# Patient Record
Sex: FEMALE | Race: WHITE | NOT HISPANIC OR LATINO | URBAN - METROPOLITAN AREA
[De-identification: names, ages, dates, MRNs, and addresses within clinical notes are randomized per-mention and may not be internally consistent; named-entity substitution may affect disease eponyms.]

---

## 2017-01-12 ENCOUNTER — EMERGENCY (EMERGENCY)
Facility: HOSPITAL | Age: 52
LOS: 1 days | Discharge: HOME | End: 2017-01-12
Attending: EMERGENCY MEDICINE
Payer: COMMERCIAL

## 2017-01-12 VITALS
SYSTOLIC BLOOD PRESSURE: 124 MMHG | WEIGHT: 149.91 LBS | HEART RATE: 134 BPM | TEMPERATURE: 99 F | RESPIRATION RATE: 20 BRPM | DIASTOLIC BLOOD PRESSURE: 84 MMHG | HEIGHT: 62 IN | OXYGEN SATURATION: 95 %

## 2017-01-12 DIAGNOSIS — Z90.710 ACQUIRED ABSENCE OF BOTH CERVIX AND UTERUS: Chronic | ICD-10-CM

## 2017-01-12 DIAGNOSIS — Z41.9 ENCOUNTER FOR PROCEDURE FOR PURPOSES OTHER THAN REMEDYING HEALTH STATE, UNSPECIFIED: Chronic | ICD-10-CM

## 2017-01-12 LAB
ALBUMIN SERPL ELPH-MCNC: 4.7 G/DL — SIGNIFICANT CHANGE UP (ref 3.3–5.2)
ALP SERPL-CCNC: 103 U/L — SIGNIFICANT CHANGE UP (ref 40–120)
ALT FLD-CCNC: 22 U/L — SIGNIFICANT CHANGE UP
ANION GAP SERPL CALC-SCNC: 16 MMOL/L — SIGNIFICANT CHANGE UP (ref 5–17)
AST SERPL-CCNC: 20 U/L — SIGNIFICANT CHANGE UP
BASOPHILS # BLD AUTO: 0 K/UL — SIGNIFICANT CHANGE UP (ref 0–0.2)
BASOPHILS NFR BLD AUTO: 0.1 % — SIGNIFICANT CHANGE UP (ref 0–2)
BILIRUB SERPL-MCNC: 0.5 MG/DL — SIGNIFICANT CHANGE UP (ref 0.4–2)
BUN SERPL-MCNC: 19 MG/DL — SIGNIFICANT CHANGE UP (ref 8–20)
CALCIUM SERPL-MCNC: 9.5 MG/DL — SIGNIFICANT CHANGE UP (ref 8.6–10.2)
CHLORIDE SERPL-SCNC: 100 MMOL/L — SIGNIFICANT CHANGE UP (ref 98–107)
CK SERPL-CCNC: 58 U/L — SIGNIFICANT CHANGE UP (ref 25–170)
CO2 SERPL-SCNC: 21 MMOL/L — LOW (ref 22–29)
CREAT SERPL-MCNC: 0.94 MG/DL — SIGNIFICANT CHANGE UP (ref 0.5–1.3)
D DIMER BLD IA.RAPID-MCNC: 272 NG/ML DDU — HIGH
EOSINOPHIL # BLD AUTO: 0 K/UL — SIGNIFICANT CHANGE UP (ref 0–0.5)
EOSINOPHIL NFR BLD AUTO: 0.1 % — SIGNIFICANT CHANGE UP (ref 0–6)
GLUCOSE SERPL-MCNC: 106 MG/DL — SIGNIFICANT CHANGE UP (ref 70–115)
HCG UR QL: NEGATIVE — SIGNIFICANT CHANGE UP
HCT VFR BLD CALC: 44.3 % — SIGNIFICANT CHANGE UP (ref 37–47)
HGB BLD-MCNC: 15.1 G/DL — SIGNIFICANT CHANGE UP (ref 12–16)
LIDOCAIN IGE QN: 19 U/L — LOW (ref 22–51)
LYMPHOCYTES # BLD AUTO: 0.7 K/UL — LOW (ref 1–4.8)
LYMPHOCYTES # BLD AUTO: 7.6 % — LOW (ref 20–55)
MCHC RBC-ENTMCNC: 30.1 PG — SIGNIFICANT CHANGE UP (ref 27–31)
MCHC RBC-ENTMCNC: 34.1 G/DL — SIGNIFICANT CHANGE UP (ref 32–36)
MCV RBC AUTO: 88.2 FL — SIGNIFICANT CHANGE UP (ref 81–99)
MONOCYTES # BLD AUTO: 0.5 K/UL — SIGNIFICANT CHANGE UP (ref 0–0.8)
MONOCYTES NFR BLD AUTO: 5.4 % — SIGNIFICANT CHANGE UP (ref 3–10)
NEUTROPHILS # BLD AUTO: 8.2 K/UL — HIGH (ref 1.8–8)
NEUTROPHILS NFR BLD AUTO: 86.6 % — HIGH (ref 37–73)
PLAT MORPH BLD: NORMAL — SIGNIFICANT CHANGE UP
PLATELET # BLD AUTO: 217 K/UL — SIGNIFICANT CHANGE UP (ref 150–400)
POTASSIUM SERPL-MCNC: 3.5 MMOL/L — SIGNIFICANT CHANGE UP (ref 3.5–5.3)
POTASSIUM SERPL-SCNC: 3.5 MMOL/L — SIGNIFICANT CHANGE UP (ref 3.5–5.3)
PROT SERPL-MCNC: 8.3 G/DL — SIGNIFICANT CHANGE UP (ref 6.6–8.7)
RBC # BLD: 5.02 M/UL — SIGNIFICANT CHANGE UP (ref 4.4–5.2)
RBC # FLD: 13.4 % — SIGNIFICANT CHANGE UP (ref 11–15.6)
RBC BLD AUTO: NORMAL — SIGNIFICANT CHANGE UP
SODIUM SERPL-SCNC: 137 MMOL/L — SIGNIFICANT CHANGE UP (ref 135–145)
TROPONIN T SERPL-MCNC: <0.01 NG/ML — SIGNIFICANT CHANGE UP (ref 0–0.06)
WBC # BLD: 9.42 K/UL — SIGNIFICANT CHANGE UP (ref 4.8–10.8)
WBC # FLD AUTO: 9.42 K/UL — SIGNIFICANT CHANGE UP (ref 4.8–10.8)

## 2017-01-12 PROCEDURE — 99285 EMERGENCY DEPT VISIT HI MDM: CPT

## 2017-01-12 RX ORDER — SODIUM CHLORIDE 9 MG/ML
1000 INJECTION INTRAMUSCULAR; INTRAVENOUS; SUBCUTANEOUS
Qty: 0 | Refills: 0 | Status: DISCONTINUED | OUTPATIENT
Start: 2017-01-12 | End: 2017-01-16

## 2017-01-12 RX ORDER — SODIUM CHLORIDE 9 MG/ML
1000 INJECTION INTRAMUSCULAR; INTRAVENOUS; SUBCUTANEOUS ONCE
Qty: 0 | Refills: 0 | Status: COMPLETED | OUTPATIENT
Start: 2017-01-12 | End: 2017-01-12

## 2017-01-12 RX ORDER — ACETAMINOPHEN 500 MG
975 TABLET ORAL ONCE
Qty: 0 | Refills: 0 | Status: COMPLETED | OUTPATIENT
Start: 2017-01-12 | End: 2017-01-12

## 2017-01-12 RX ORDER — METOCLOPRAMIDE HCL 10 MG
10 TABLET ORAL ONCE
Qty: 0 | Refills: 0 | Status: COMPLETED | OUTPATIENT
Start: 2017-01-12 | End: 2017-01-12

## 2017-01-12 RX ORDER — PANTOPRAZOLE SODIUM 20 MG/1
40 TABLET, DELAYED RELEASE ORAL ONCE
Qty: 0 | Refills: 0 | Status: COMPLETED | OUTPATIENT
Start: 2017-01-12 | End: 2017-01-12

## 2017-01-12 RX ADMIN — Medication 10 MILLIGRAM(S): at 20:51

## 2017-01-12 RX ADMIN — PANTOPRAZOLE SODIUM 40 MILLIGRAM(S): 20 TABLET, DELAYED RELEASE ORAL at 20:50

## 2017-01-12 RX ADMIN — Medication 975 MILLIGRAM(S): at 21:06

## 2017-01-12 RX ADMIN — SODIUM CHLORIDE 1000 MILLILITER(S): 9 INJECTION INTRAMUSCULAR; INTRAVENOUS; SUBCUTANEOUS at 21:06

## 2017-01-12 NOTE — ED ADULT TRIAGE NOTE - CHIEF COMPLAINT QUOTE
pt presents to ED with abd pain x few weeks with n/v and fever, breathing si even and unlabored. pt has hx os ashburgers., pt requesting quiet room.

## 2017-01-12 NOTE — ED ADULT NURSE NOTE - NS ED NURSE DC INFO COMPLEXITY
Returned Demonstration/Verbalized Understanding/Simple: Patient demonstrates quick and easy understanding

## 2017-01-12 NOTE — ED PROVIDER NOTE - MEDICAL DECISION MAKING DETAILS
Abdominal pain, diarrhea, and belching.  R/O Diverticulitis unlikely bowel obstruction.  Will check labs and CT.

## 2017-01-12 NOTE — ED STATDOCS - NS ED MD SCRIBE ATTENDING SCRIBE SECTIONS
REVIEW OF SYSTEMS/PAST MEDICAL/SURGICAL/SOCIAL HISTORY/VITAL SIGNS( Pullset)/DISPOSITION/HIV/HISTORY OF PRESENT ILLNESS/PHYSICAL EXAM/INTAKE ASSESSMENT/SCREENINGS

## 2017-01-12 NOTE — ED BEHAVIORAL HEALTH NOTE - BEHAVIORAL HEALTH NOTE
SW Note - pt aggitated and aggressive in ER - pt states she has Aspergers and sensory issues and that the sights/sounds/smells of the ER are causing her to be hysterical. Tried to calm pt, provided earplugs, but she continued to scream and demand a private quiet room, it was explained that there was no such space available. AND- Shraddha Marquez was present.. all possible efforts were being made to assist pt - nothing helped.

## 2017-01-12 NOTE — ED STATDOCS - MEDICAL DECISION MAKING DETAILS
poor historian, vague complaints that include chronic abdominal pain with recent exacerbation seen by PMD, undergoing work up included out patient CT in several days, will treat symptotically for colitis/ obstruction, EKG/ cardiac enzymes given patients concern for cardiac etiology but low suspicion due to atypical presentation.

## 2017-01-12 NOTE — ED STATDOCS - PMH
Anxiety    Asperger's disorder    CVA (cerebral infarction)    Heart abnormality  hole  Sjogren-Syed syndrome

## 2017-01-12 NOTE — ED STATDOCS - PROGRESS NOTE DETAILS
This is a 52 y/o F with hx of multiple TIA, HLD, Sjögren-Syed disorder, hole in heart, and hysterectomy in 2002 and tummy tuck in 2008 presenting to the ED complaining of intermittent abdominal pain, fever, indigestion, and diarrhea. Pt was seen by GI doctor x 6 days and given medication for spasm TID that has improved pain, has schedule for abdominal CT next week. She reports PO intolerance. Also voices stabbing pain to her neck. Denies symptoms at present. Denies CP, vaginal discharge, hematuria, and SOB.  medication: Crestor, Vit-D deficiency, Topamax, vivance, and anxiety medication.  PMD: Dr. Bravo  Focused eval, protocol orders entered. Pt to be moved to main ED for complete evaluation by another provider.

## 2017-01-12 NOTE — ED PROVIDER NOTE - OBJECTIVE STATEMENT
This patient is a 51 year old woman who presents to the ED c/o worsening abdominal pain, bloating and diarrhea.  The symptoms have been occurring intermittently until a few days ago and is now constant.  Associated symptoms include continued belching which began today.  She denies fever, sick contacts, recent travel, dysuria, and hematuria.  Patient has been seen by GI Dr. Hernandes in the past for issues with constipation.  No prior abdominal surgeries.

## 2017-01-12 NOTE — ED ADULT NURSE NOTE - OBJECTIVE STATEMENT
51 year old female, c/o nausea, abdominal pain, states hx of abdominal pain in past, also c/o diarrhea, alert and oriented x3, no chest pain, no SOB

## 2017-01-13 VITALS
OXYGEN SATURATION: 98 % | RESPIRATION RATE: 18 BRPM | TEMPERATURE: 100 F | SYSTOLIC BLOOD PRESSURE: 99 MMHG | HEART RATE: 85 BPM | DIASTOLIC BLOOD PRESSURE: 56 MMHG

## 2017-01-13 PROCEDURE — 81025 URINE PREGNANCY TEST: CPT

## 2017-01-13 PROCEDURE — 96374 THER/PROPH/DIAG INJ IV PUSH: CPT | Mod: XU

## 2017-01-13 PROCEDURE — 99284 EMERGENCY DEPT VISIT MOD MDM: CPT | Mod: 25

## 2017-01-13 PROCEDURE — 74177 CT ABD & PELVIS W/CONTRAST: CPT

## 2017-01-13 PROCEDURE — 85027 COMPLETE CBC AUTOMATED: CPT

## 2017-01-13 PROCEDURE — 84484 ASSAY OF TROPONIN QUANT: CPT

## 2017-01-13 PROCEDURE — 85379 FIBRIN DEGRADATION QUANT: CPT

## 2017-01-13 PROCEDURE — 84436 ASSAY OF TOTAL THYROXINE: CPT

## 2017-01-13 PROCEDURE — 84443 ASSAY THYROID STIM HORMONE: CPT

## 2017-01-13 PROCEDURE — 80053 COMPREHEN METABOLIC PANEL: CPT

## 2017-01-13 PROCEDURE — 74177 CT ABD & PELVIS W/CONTRAST: CPT | Mod: 26

## 2017-01-13 PROCEDURE — 82550 ASSAY OF CK (CPK): CPT

## 2017-01-13 PROCEDURE — 83690 ASSAY OF LIPASE: CPT

## 2017-01-13 PROCEDURE — 71275 CT ANGIOGRAPHY CHEST: CPT

## 2017-01-13 PROCEDURE — 96375 TX/PRO/DX INJ NEW DRUG ADDON: CPT | Mod: XU

## 2017-01-13 PROCEDURE — 71275 CT ANGIOGRAPHY CHEST: CPT | Mod: 26

## 2017-01-13 NOTE — ED ADULT NURSE REASSESSMENT NOTE - NS ED NURSE REASSESS COMMENT FT1
patient sleeping - easily arousable, when awake states that she was incontinent on herself and needed assistance to the bathroom.

## 2017-09-21 ENCOUNTER — OUTPATIENT (OUTPATIENT)
Dept: OUTPATIENT SERVICES | Facility: HOSPITAL | Age: 52
LOS: 1 days | End: 2017-09-21
Payer: COMMERCIAL

## 2017-09-21 VITALS
WEIGHT: 143.08 LBS | OXYGEN SATURATION: 98 % | HEIGHT: 63 IN | SYSTOLIC BLOOD PRESSURE: 122 MMHG | TEMPERATURE: 99 F | DIASTOLIC BLOOD PRESSURE: 84 MMHG | HEART RATE: 90 BPM | RESPIRATION RATE: 18 BRPM

## 2017-09-21 DIAGNOSIS — Z01.810 ENCOUNTER FOR PREPROCEDURAL CARDIOVASCULAR EXAMINATION: ICD-10-CM

## 2017-09-21 DIAGNOSIS — Z90.710 ACQUIRED ABSENCE OF BOTH CERVIX AND UTERUS: Chronic | ICD-10-CM

## 2017-09-21 DIAGNOSIS — Z41.9 ENCOUNTER FOR PROCEDURE FOR PURPOSES OTHER THAN REMEDYING HEALTH STATE, UNSPECIFIED: Chronic | ICD-10-CM

## 2017-09-21 DIAGNOSIS — R07.89 OTHER CHEST PAIN: ICD-10-CM

## 2017-09-21 DIAGNOSIS — Z98.890 OTHER SPECIFIED POSTPROCEDURAL STATES: Chronic | ICD-10-CM

## 2017-09-21 LAB
ANION GAP SERPL CALC-SCNC: 14 MMOL/L — SIGNIFICANT CHANGE UP (ref 5–17)
APTT BLD: 29.8 SEC — SIGNIFICANT CHANGE UP (ref 27.5–37.4)
BUN SERPL-MCNC: 17 MG/DL — SIGNIFICANT CHANGE UP (ref 8–20)
CALCIUM SERPL-MCNC: 9.9 MG/DL — SIGNIFICANT CHANGE UP (ref 8.6–10.2)
CHLORIDE SERPL-SCNC: 106 MMOL/L — SIGNIFICANT CHANGE UP (ref 98–107)
CHOLEST SERPL-MCNC: 184 MG/DL — SIGNIFICANT CHANGE UP (ref 110–199)
CO2 SERPL-SCNC: 23 MMOL/L — SIGNIFICANT CHANGE UP (ref 22–29)
CREAT SERPL-MCNC: 1.12 MG/DL — SIGNIFICANT CHANGE UP (ref 0.5–1.3)
GLUCOSE SERPL-MCNC: 116 MG/DL — HIGH (ref 70–115)
HCG SERPL-ACNC: <2 MIU/ML — SIGNIFICANT CHANGE UP
HCT VFR BLD CALC: 42.7 % — SIGNIFICANT CHANGE UP (ref 37–47)
HDLC SERPL-MCNC: 52 MG/DL — LOW
HGB BLD-MCNC: 14 G/DL — SIGNIFICANT CHANGE UP (ref 12–16)
INR BLD: 0.96 RATIO — SIGNIFICANT CHANGE UP (ref 0.88–1.16)
LIPID PNL WITH DIRECT LDL SERPL: 95 MG/DL — SIGNIFICANT CHANGE UP
MCHC RBC-ENTMCNC: 28.5 PG — SIGNIFICANT CHANGE UP (ref 27–31)
MCHC RBC-ENTMCNC: 32.8 G/DL — SIGNIFICANT CHANGE UP (ref 32–36)
MCV RBC AUTO: 87 FL — SIGNIFICANT CHANGE UP (ref 81–99)
PLATELET # BLD AUTO: 263 K/UL — SIGNIFICANT CHANGE UP (ref 150–400)
POTASSIUM SERPL-MCNC: 4 MMOL/L — SIGNIFICANT CHANGE UP (ref 3.5–5.3)
POTASSIUM SERPL-SCNC: 4 MMOL/L — SIGNIFICANT CHANGE UP (ref 3.5–5.3)
PROTHROM AB SERPL-ACNC: 10.5 SEC — SIGNIFICANT CHANGE UP (ref 9.8–12.7)
RBC # BLD: 4.91 M/UL — SIGNIFICANT CHANGE UP (ref 4.4–5.2)
RBC # FLD: 12.8 % — SIGNIFICANT CHANGE UP (ref 11–15.6)
SODIUM SERPL-SCNC: 143 MMOL/L — SIGNIFICANT CHANGE UP (ref 135–145)
TOTAL CHOLESTEROL/HDL RATIO MEASUREMENT: 4 RATIO — SIGNIFICANT CHANGE UP (ref 3.3–7.1)
TRIGL SERPL-MCNC: 186 MG/DL — SIGNIFICANT CHANGE UP (ref 10–200)
WBC # BLD: 7.1 K/UL — SIGNIFICANT CHANGE UP (ref 4.8–10.8)
WBC # FLD AUTO: 7.1 K/UL — SIGNIFICANT CHANGE UP (ref 4.8–10.8)

## 2017-09-21 PROCEDURE — 93010 ELECTROCARDIOGRAM REPORT: CPT

## 2017-09-21 PROCEDURE — 85027 COMPLETE CBC AUTOMATED: CPT

## 2017-09-21 PROCEDURE — 80061 LIPID PANEL: CPT

## 2017-09-21 PROCEDURE — 80048 BASIC METABOLIC PNL TOTAL CA: CPT

## 2017-09-21 PROCEDURE — 93005 ELECTROCARDIOGRAM TRACING: CPT

## 2017-09-21 PROCEDURE — 85610 PROTHROMBIN TIME: CPT

## 2017-09-21 PROCEDURE — G0463: CPT

## 2017-09-21 PROCEDURE — 85730 THROMBOPLASTIN TIME PARTIAL: CPT

## 2017-09-21 PROCEDURE — 84702 CHORIONIC GONADOTROPIN TEST: CPT

## 2017-09-21 PROCEDURE — 36415 COLL VENOUS BLD VENIPUNCTURE: CPT

## 2017-09-21 NOTE — H&P PST ADULT - HISTORY OF PRESENT ILLNESS
This is a 52 yo female with history of Asperger syndrome who presents today for PST in anticipation of having a cardiac cath.  She is 3 weeks post umbilical hernia repair as well as exploratory laparotomy for chronic abdominal pain.    She has had dyspnea since the surgery.  She went to follow up with her cardiologist Dr Alan.   She had a CT angio to R/O PE which was negative.  She also had an ECHO that revealed an EF of 60 to 65% and otherwise normal.

## 2017-09-21 NOTE — H&P PST ADULT - ASSESSMENT
52 yo female with progressive ESTRELLA and significant family history of premature CAD for cardiac cath .      Plan: PRE-PROCEDURE ASSESSMENT  Cardiac cath with possible intervention   -Patient seen and examined  -Labs reviewed  -Pre-procedure teaching completed with patient   -Questions answered about patients concerns    - pt instructed to hold Vyvanse  day of the  procedure   -instructed to NPO after 7 am

## 2017-09-21 NOTE — ASU PATIENT PROFILE, ADULT - PSH
History of hernia repair  Sept 2017,  umbilical,  had exp.  lap as well  S/P hysterectomy    Surgery, other elective  hysterectomy

## 2017-09-29 ENCOUNTER — TRANSCRIPTION ENCOUNTER (OUTPATIENT)
Age: 52
End: 2017-09-29

## 2017-09-29 ENCOUNTER — OUTPATIENT (OUTPATIENT)
Dept: OUTPATIENT SERVICES | Facility: HOSPITAL | Age: 52
LOS: 1 days | Discharge: HOME | End: 2017-09-29
Payer: COMMERCIAL

## 2017-09-29 VITALS
OXYGEN SATURATION: 100 % | DIASTOLIC BLOOD PRESSURE: 66 MMHG | RESPIRATION RATE: 15 BRPM | HEART RATE: 66 BPM | SYSTOLIC BLOOD PRESSURE: 114 MMHG

## 2017-09-29 VITALS
RESPIRATION RATE: 16 BRPM | HEART RATE: 72 BPM | TEMPERATURE: 98 F | OXYGEN SATURATION: 99 % | DIASTOLIC BLOOD PRESSURE: 71 MMHG | SYSTOLIC BLOOD PRESSURE: 122 MMHG

## 2017-09-29 DIAGNOSIS — Z98.890 OTHER SPECIFIED POSTPROCEDURAL STATES: Chronic | ICD-10-CM

## 2017-09-29 DIAGNOSIS — Z01.810 ENCOUNTER FOR PREPROCEDURAL CARDIOVASCULAR EXAMINATION: ICD-10-CM

## 2017-09-29 DIAGNOSIS — Z90.710 ACQUIRED ABSENCE OF BOTH CERVIX AND UTERUS: Chronic | ICD-10-CM

## 2017-09-29 DIAGNOSIS — R07.89 OTHER CHEST PAIN: ICD-10-CM

## 2017-09-29 DIAGNOSIS — Z41.9 ENCOUNTER FOR PROCEDURE FOR PURPOSES OTHER THAN REMEDYING HEALTH STATE, UNSPECIFIED: Chronic | ICD-10-CM

## 2017-09-29 PROCEDURE — 93458 L HRT ARTERY/VENTRICLE ANGIO: CPT

## 2017-09-29 PROCEDURE — C1769: CPT

## 2017-09-29 PROCEDURE — C1887: CPT

## 2017-09-29 PROCEDURE — C1894: CPT

## 2017-09-29 RX ORDER — LISDEXAMFETAMINE DIMESYLATE 70 MG/1
1 CAPSULE ORAL
Qty: 0 | Refills: 0 | COMMUNITY

## 2017-09-29 RX ORDER — MULTIVIT-MIN/FERROUS GLUCONATE 9 MG/15 ML
1 LIQUID (ML) ORAL
Qty: 0 | Refills: 0 | COMMUNITY

## 2017-09-29 RX ORDER — OMEGA-3 ACID ETHYL ESTERS 1 G
1 CAPSULE ORAL
Qty: 0 | Refills: 0 | COMMUNITY

## 2017-09-29 RX ORDER — TRAZODONE HCL 50 MG
1 TABLET ORAL
Qty: 0 | Refills: 0 | COMMUNITY

## 2017-09-29 RX ORDER — ROSUVASTATIN CALCIUM 5 MG/1
1 TABLET ORAL
Qty: 0 | Refills: 0 | COMMUNITY

## 2017-09-29 RX ORDER — HYOSCYAMINE SULFATE 0.13 MG
1 TABLET ORAL
Qty: 0 | Refills: 0 | COMMUNITY

## 2017-09-29 NOTE — DISCHARGE NOTE ADULT - MEDICATION SUMMARY - MEDICATIONS TO TAKE
I will START or STAY ON the medications listed below when I get home from the hospital:    calcium carbonate 1200 mg/ vit d- min  -- 1 tab(s) by mouth once a day  -- Indication: For supplement    aspirin 81 mg oral delayed release capsule  --  by mouth   -- Indication: For antiplatelet    Topamax 200 mg oral tablet  --  by mouth once a day (at bedtime)  -- Indication: For adjunct tx    traZODone 50 mg oral tablet  -- 1 tab(s) by mouth once a day (at bedtime)  -- Indication: For psychoterapeutic    rosuvastatin 10 mg oral tablet  -- 1 tab(s) by mouth once a day (at bedtime)  -- Indication: For hyperlipidemia    Crestor 10 mg oral tablet  -- 1 tab(s) by mouth once a day (at bedtime)  -- Indication: For hyperlipidemia    Vyvanse 50 mg oral capsule  -- 1 cap(s) by mouth once a day (in the morning)  -- Indication: For CNS    hyoscyamine 0.125 mg sublingual tablet  -- 1 tab(s) under tongue every 4 hours, As Needed - for indigestion, for heartburn  -- Indication: For stomach    Fish Oil 1000 mg oral capsule  -- 1 cap(s) by mouth once a day  -- Indication: For supplement    Multiple Vitamins with Minerals oral capsule  -- 1 cap(s) by mouth once a day  -- Indication: For supplement

## 2017-09-29 NOTE — PROGRESS NOTE ADULT - SUBJECTIVE AND OBJECTIVE BOX
I have seen and examined this patient. There is no change since the last H& P. Consent obtained. Agree with cardiac cath. Risks and benefits fully explained. All questions answered.    Dexter Giron MD

## 2017-09-29 NOTE — PROGRESS NOTE ADULT - SUBJECTIVE AND OBJECTIVE BOX
Nurse Practitioner Progress note:     TELEMETRY: NSR    T(C): 36.9 (09-29-17 @ 09:40), Max: 36.9 (09-29-17 @ 09:40)  HR: 76 (09-29-17 @ 13:30) (72 - 76)  BP: 108/73 (09-29-17 @ 13:30) (108/73 - 122/71)  RR: 16 (09-29-17 @ 13:30) (16 - 16)  SpO2: 95% (09-29-17 @ 13:30) (95% - 99%)  Wt(kg): --    PHYSICAL EXAM:  Appearance: Normal	  HEENT:   Normal oral mucosa, PERRL  Cardiovascular: Normal S1 S2, No JVD, No murmurs, No edema  Respiratory: Lungs clear to auscultation	  Psychiatry: A & O x 3, Mood & affect appropriate  Procedure Site: Right radial band in place.  Site benign.  No bleeding/hematoma/ecchymosis.  + radial pulse      PROCEDURE RESULTS: S/P LHC which revealed normal coronaries.  No intervention.    ASSESSMENT/PLAN: 	  -Radial precautions  -D/C radial band in 1 hour  -Resume home meds   -Discharge to home

## 2017-09-29 NOTE — DISCHARGE NOTE ADULT - NS AS ACTIVITY OBS
Do not make important decisions/Do not drive or operate machinery/Walking-Outdoors allowed/Stairs allowed/Showering allowed/No Heavy lifting/straining/Walking-Indoors allowed

## 2017-09-29 NOTE — DISCHARGE NOTE ADULT - HOSPITAL COURSE
51 year old female with c/o progressive SOB.  S/P LHC which revealed normal coronaries. No intervention required.  Discharge to home.

## 2017-09-29 NOTE — DISCHARGE NOTE ADULT - CARE PROVIDER_API CALL
Clare Saba), Cardiovascular Disease; Internal Medicine  [address] [place] [address]  Phone: [phone]  Fax: [phone]

## 2017-09-29 NOTE — PROGRESS NOTE ADULT - SUBJECTIVE AND OBJECTIVE BOX
Cardiology NP    51 year old female with Asperger syndrome who c/o ESTRELLA.   Pt highly offended when ask for urine for hcg. Pt states she had a hysterectomy and can't be pregnant and that we are not doing our job.  I tried to explain to her that we still do urine.  Pt refusing and agitated.  No change in medical history since PST.

## 2017-09-29 NOTE — DISCHARGE NOTE ADULT - PATIENT PORTAL LINK FT
“You can access the Follow[employer] Patient Portal, offered by [facility], by registering with the following website: [email or web address]”

## 2018-01-22 ENCOUNTER — APPOINTMENT (OUTPATIENT)
Dept: ORTHOPEDIC SURGERY | Facility: CLINIC | Age: 53
End: 2018-01-22
Payer: COMMERCIAL

## 2018-01-22 VITALS
DIASTOLIC BLOOD PRESSURE: 77 MMHG | BODY MASS INDEX: 25.69 KG/M2 | HEART RATE: 93 BPM | WEIGHT: 145 LBS | SYSTOLIC BLOOD PRESSURE: 117 MMHG | HEIGHT: 63 IN

## 2018-01-22 DIAGNOSIS — Z87.09 PERSONAL HISTORY OF OTHER DISEASES OF THE RESPIRATORY SYSTEM: ICD-10-CM

## 2018-01-22 DIAGNOSIS — Z82.62 FAMILY HISTORY OF OSTEOPOROSIS: ICD-10-CM

## 2018-01-22 DIAGNOSIS — Z86.39 PERSONAL HISTORY OF OTHER ENDOCRINE, NUTRITIONAL AND METABOLIC DISEASE: ICD-10-CM

## 2018-01-22 DIAGNOSIS — Z78.9 OTHER SPECIFIED HEALTH STATUS: ICD-10-CM

## 2018-01-22 DIAGNOSIS — Z80.9 FAMILY HISTORY OF MALIGNANT NEOPLASM, UNSPECIFIED: ICD-10-CM

## 2018-01-22 DIAGNOSIS — Z86.59 PERSONAL HISTORY OF OTHER MENTAL AND BEHAVIORAL DISORDERS: ICD-10-CM

## 2018-01-22 DIAGNOSIS — R45.0 NERVOUSNESS: ICD-10-CM

## 2018-01-22 DIAGNOSIS — Z82.61 FAMILY HISTORY OF ARTHRITIS: ICD-10-CM

## 2018-01-22 DIAGNOSIS — Z87.39 PERSONAL HISTORY OF OTHER DISEASES OF THE MUSCULOSKELETAL SYSTEM AND CONNECTIVE TISSUE: ICD-10-CM

## 2018-01-22 PROCEDURE — 20610 DRAIN/INJ JOINT/BURSA W/O US: CPT | Mod: RT

## 2018-01-22 PROCEDURE — 99204 OFFICE O/P NEW MOD 45 MIN: CPT | Mod: 25

## 2018-02-16 ENCOUNTER — OUTPATIENT (OUTPATIENT)
Dept: OUTPATIENT SERVICES | Facility: HOSPITAL | Age: 53
LOS: 1 days | End: 2018-02-16
Payer: COMMERCIAL

## 2018-02-16 DIAGNOSIS — Z87.891 PERSONAL HISTORY OF NICOTINE DEPENDENCE: ICD-10-CM

## 2018-02-16 DIAGNOSIS — Z91.030 BEE ALLERGY STATUS: ICD-10-CM

## 2018-02-16 DIAGNOSIS — E78.5 HYPERLIPIDEMIA, UNSPECIFIED: ICD-10-CM

## 2018-02-16 DIAGNOSIS — Z90.710 ACQUIRED ABSENCE OF BOTH CERVIX AND UTERUS: Chronic | ICD-10-CM

## 2018-02-16 DIAGNOSIS — F41.9 ANXIETY DISORDER, UNSPECIFIED: ICD-10-CM

## 2018-02-16 DIAGNOSIS — Z82.49 FAMILY HISTORY OF ISCHEMIC HEART DISEASE AND OTHER DISEASES OF THE CIRCULATORY SYSTEM: ICD-10-CM

## 2018-02-16 DIAGNOSIS — M75.41 IMPINGEMENT SYNDROME OF RIGHT SHOULDER: ICD-10-CM

## 2018-02-16 DIAGNOSIS — Q87.1 CONGENITAL MALFORMATION SYNDROMES PREDOMINANTLY ASSOCIATED WITH SHORT STATURE: ICD-10-CM

## 2018-02-16 DIAGNOSIS — I20.8 OTHER FORMS OF ANGINA PECTORIS: ICD-10-CM

## 2018-02-16 DIAGNOSIS — Z51.89 ENCOUNTER FOR OTHER SPECIFIED AFTERCARE: ICD-10-CM

## 2018-02-16 DIAGNOSIS — Z41.9 ENCOUNTER FOR PROCEDURE FOR PURPOSES OTHER THAN REMEDYING HEALTH STATE, UNSPECIFIED: Chronic | ICD-10-CM

## 2018-02-16 DIAGNOSIS — F84.5 ASPERGER'S SYNDROME: ICD-10-CM

## 2018-02-16 DIAGNOSIS — Z98.890 OTHER SPECIFIED POSTPROCEDURAL STATES: Chronic | ICD-10-CM

## 2018-04-16 NOTE — ED ADULT NURSE NOTE - PAIN RATING/NUMBER SCALE (0-10): REST
Nursing Transfer Note      4/15/2018     Transfer To: 402    Transfer via wheelchair    Transfer with cardiac monitoring    Transported by JAKOB Lee    Medicines sent: yes; Prednisone tablet    Chart send with patient: Yes    Notified: spouse    Patient reassessed at: 4/15/18 2050    Upon arrival to floor: cardiac monitor applied, patient oriented to room, call bell in reach, pt belongings sent, and bed in lowest position.    Report given to JAKOB Booth  
8

## 2018-04-27 ENCOUNTER — APPOINTMENT (OUTPATIENT)
Dept: ORTHOPEDIC SURGERY | Facility: CLINIC | Age: 53
End: 2018-04-27
Payer: COMMERCIAL

## 2018-04-27 PROCEDURE — 99213 OFFICE O/P EST LOW 20 MIN: CPT

## 2018-05-07 NOTE — DISCHARGE NOTE ADULT - CARE PLAN
Please see [employer] message and advise.   Krystle Peñaloza RN      Principal Discharge DX:	SOB (shortness of breath)  Goal:	optimal function  Instructions for follow-up, activity and diet:	no change

## 2018-06-16 PROCEDURE — 97163 PT EVAL HIGH COMPLEX 45 MIN: CPT

## 2018-06-16 PROCEDURE — 97010 HOT OR COLD PACKS THERAPY: CPT

## 2018-06-16 PROCEDURE — 97140 MANUAL THERAPY 1/> REGIONS: CPT

## 2018-06-16 PROCEDURE — 97110 THERAPEUTIC EXERCISES: CPT

## 2018-06-16 PROCEDURE — 97750 PHYSICAL PERFORMANCE TEST: CPT

## 2018-07-02 ENCOUNTER — APPOINTMENT (OUTPATIENT)
Dept: ORTHOPEDIC SURGERY | Facility: CLINIC | Age: 53
End: 2018-07-02
Payer: COMMERCIAL

## 2018-07-02 VITALS
TEMPERATURE: 99.7 F | DIASTOLIC BLOOD PRESSURE: 72 MMHG | SYSTOLIC BLOOD PRESSURE: 105 MMHG | WEIGHT: 145 LBS | HEART RATE: 87 BPM | BODY MASS INDEX: 25.69 KG/M2 | HEIGHT: 63 IN

## 2018-07-02 DIAGNOSIS — M75.41 IMPINGEMENT SYNDROME OF RIGHT SHOULDER: ICD-10-CM

## 2018-07-02 DIAGNOSIS — M75.111 INCOMPLETE ROTATOR CUFF TEAR OR RUPTURE OF RIGHT SHOULDER, NOT SPECIFIED AS TRAUMATIC: ICD-10-CM

## 2018-07-02 DIAGNOSIS — M54.2 CERVICALGIA: ICD-10-CM

## 2018-07-02 PROCEDURE — 99213 OFFICE O/P EST LOW 20 MIN: CPT

## 2018-07-24 PROBLEM — M75.111 INCOMPLETE TEAR OF RIGHT ROTATOR CUFF: Status: ACTIVE | Noted: 2018-04-27

## 2018-07-24 PROBLEM — M54.2 CERVICALGIA: Status: ACTIVE | Noted: 2018-04-27

## 2018-07-24 PROBLEM — M75.41 IMPINGEMENT SYNDROME OF RIGHT SHOULDER: Status: ACTIVE | Noted: 2018-01-22

## 2018-12-17 ENCOUNTER — TRANSCRIPTION ENCOUNTER (OUTPATIENT)
Age: 53
End: 2018-12-17

## 2020-03-16 ENCOUNTER — TRANSCRIPTION ENCOUNTER (OUTPATIENT)
Age: 55
End: 2020-03-16

## 2020-04-28 ENCOUNTER — APPOINTMENT (OUTPATIENT)
Dept: PULMONOLOGY | Facility: CLINIC | Age: 55
End: 2020-04-28
Payer: MEDICAID

## 2020-04-28 DIAGNOSIS — Z87.891 PERSONAL HISTORY OF NICOTINE DEPENDENCE: ICD-10-CM

## 2020-04-28 PROCEDURE — 99204 OFFICE O/P NEW MOD 45 MIN: CPT | Mod: 95

## 2020-04-28 NOTE — HISTORY OF PRESENT ILLNESS
[Home] : at home, [unfilled] , at the time of the visit. [Medical Office: ([facility])___] : at the medical office located in  [Patient] : the patient [FreeTextEntry2] : Varsha Powers [Self] : self [TextBox_4] : The patient is a 54-year-old female who comes for sleep evaluation.Approximately 5-6 years ago she had a sleep study which did not show obstructive sleep apnea. More recently she got a watch that monitors her vital signs. She's noticed that during sleep she desaturates into the 80% range.  She wishes to be retested for this.\par \par The patient smoked minimally in the distant past. She exercises without shortness of breath. She denies cough or wheeze. [Tired while Driving] : tired while driving [Snoring] : snoring [TextBox_77] : 1130pm [TextBox_81] : 5 [TextBox_79] : 7am [TextBox_93] : Poor memory [TextBox_89] : 1

## 2020-04-28 NOTE — ASSESSMENT
[FreeTextEntry1] : Patient has possible obstructive sleep apnea. We'll get a home sleep study on her and I will see her back after that. Treatment options will be discussed at that time.

## 2020-05-07 ENCOUNTER — OUTPATIENT (OUTPATIENT)
Dept: OUTPATIENT SERVICES | Facility: HOSPITAL | Age: 55
LOS: 1 days | End: 2020-05-07
Payer: MEDICARE

## 2020-05-07 DIAGNOSIS — Z41.9 ENCOUNTER FOR PROCEDURE FOR PURPOSES OTHER THAN REMEDYING HEALTH STATE, UNSPECIFIED: Chronic | ICD-10-CM

## 2020-05-07 DIAGNOSIS — Z90.710 ACQUIRED ABSENCE OF BOTH CERVIX AND UTERUS: Chronic | ICD-10-CM

## 2020-05-07 DIAGNOSIS — Z98.890 OTHER SPECIFIED POSTPROCEDURAL STATES: Chronic | ICD-10-CM

## 2020-05-07 DIAGNOSIS — G47.33 OBSTRUCTIVE SLEEP APNEA (ADULT) (PEDIATRIC): ICD-10-CM

## 2020-05-07 PROCEDURE — G0399: CPT

## 2020-05-07 PROCEDURE — 95806 SLEEP STUDY UNATT&RESP EFFT: CPT

## 2020-05-07 PROCEDURE — 95806 SLEEP STUDY UNATT&RESP EFFT: CPT | Mod: 26

## 2020-05-21 ENCOUNTER — APPOINTMENT (OUTPATIENT)
Dept: PULMONOLOGY | Facility: CLINIC | Age: 55
End: 2020-05-21
Payer: MEDICARE

## 2020-05-21 VITALS — BODY MASS INDEX: 20.8 KG/M2 | WEIGHT: 113 LBS | HEIGHT: 62 IN

## 2020-05-21 DIAGNOSIS — G47.33 OBSTRUCTIVE SLEEP APNEA (ADULT) (PEDIATRIC): ICD-10-CM

## 2020-05-21 PROCEDURE — 99442: CPT | Mod: 95

## 2020-05-21 RX ORDER — MELOXICAM 7.5 MG/1
7.5 TABLET ORAL TWICE DAILY
Qty: 20 | Refills: 2 | Status: DISCONTINUED | COMMUNITY
Start: 2018-01-22 | End: 2020-05-21

## 2020-06-12 ENCOUNTER — APPOINTMENT (OUTPATIENT)
Dept: RHEUMATOLOGY | Facility: CLINIC | Age: 55
End: 2020-06-12
Payer: MEDICARE

## 2020-06-12 DIAGNOSIS — H04.123 DRY EYE SYNDROME OF BILATERAL LACRIMAL GLANDS: ICD-10-CM

## 2020-06-12 DIAGNOSIS — Z87.19 PERSONAL HISTORY OF OTHER DISEASES OF THE DIGESTIVE SYSTEM: ICD-10-CM

## 2020-06-12 DIAGNOSIS — M35.00 SICCA SYNDROME, UNSPECIFIED: ICD-10-CM

## 2020-06-12 DIAGNOSIS — R53.82 CHRONIC FATIGUE, UNSPECIFIED: ICD-10-CM

## 2020-06-12 PROCEDURE — 99204 OFFICE O/P NEW MOD 45 MIN: CPT | Mod: 95

## 2020-06-12 NOTE — ASSESSMENT
[FreeTextEntry1] : 54 year old female evaluated today via telehealth ([employer]) for an initial evaluation. Reports to have hx of Sjogrens syndrome, chronic fatigue, TIAs? dx years ago- 2000. Was previously following neurology for TIA? and rheumatology and then lost to follow up. Today reports to have dry mouth, + myalgias- to LE, + raynauds. Also reports to have intermittent fevers, CP, tachycardia, SOB, since 01/2020 s/p travel to [place]?? Will do further w/o as below. \par \par - labs as below\par - topical eye drops, opthalmology f.u\par - encouraged hydration, lozenges, omega 3 FA supplementation for dry mouth\par - avoidance of caffeine, sugars, candy, food that can exacerbate dry mouth\par \par Discussed treatment plan with the patient. The patient was given the opportunity to ask questions and all questions were answered to their satisfaction.

## 2020-06-12 NOTE — HISTORY OF PRESENT ILLNESS
[Home] : at home, [unfilled] , at the time of the visit. [Medical Office: ([facility])___] : at the medical office located in  [Verbal consent obtained from patient] : the patient, [unfilled] [Parents] : parents [FreeTextEntry1] : 54 year old female evaluated today via telehealth ([employer]) for an initial evaluation.\par \par Reports to have hx of Sjogrens syndrome, chronic fatigue, TIAs? dx years ago- 2000. Was previously following neurology for TIA? and rheumatology and then lost to follow up.\par \par Has dry mouth, + myalgias- to LE, + raynauds to BL hands/feet. denies miscarriages, rashes. denies joint pain/swelling. Not currently taking any medications. \par \par Has hx of intermittent fevers, CP, tachycardia, SOB, since 01/2020 s/p travel to [place]?\par \par social: denies tobacco, denies alcohol use, denies drug use\par FH: mother with RA\par

## 2020-06-22 ENCOUNTER — LABORATORY RESULT (OUTPATIENT)
Age: 55
End: 2020-06-22

## 2020-07-01 ENCOUNTER — NON-APPOINTMENT (OUTPATIENT)
Age: 55
End: 2020-07-01

## 2020-07-01 ENCOUNTER — APPOINTMENT (OUTPATIENT)
Dept: CARDIOLOGY | Facility: CLINIC | Age: 55
End: 2020-07-01
Payer: MEDICARE

## 2020-07-01 VITALS
WEIGHT: 122 LBS | BODY MASS INDEX: 22.45 KG/M2 | OXYGEN SATURATION: 100 % | HEIGHT: 62 IN | DIASTOLIC BLOOD PRESSURE: 79 MMHG | SYSTOLIC BLOOD PRESSURE: 118 MMHG | HEART RATE: 84 BPM

## 2020-07-01 PROCEDURE — 99205 OFFICE O/P NEW HI 60 MIN: CPT

## 2020-07-01 PROCEDURE — 93000 ELECTROCARDIOGRAM COMPLETE: CPT

## 2020-07-01 RX ORDER — TRAZODONE HYDROCHLORIDE 50 MG/1
50 TABLET ORAL
Qty: 30 | Refills: 0 | Status: ACTIVE | COMMUNITY

## 2020-07-01 RX ORDER — LYSINE HCL 500 MG
600 TABLET ORAL
Refills: 0 | Status: ACTIVE | COMMUNITY
Start: 2020-05-21

## 2020-07-01 RX ORDER — ROSUVASTATIN CALCIUM 10 MG/1
10 TABLET, FILM COATED ORAL DAILY
Qty: 90 | Refills: 1 | Status: ACTIVE | COMMUNITY

## 2020-07-01 RX ORDER — TOPIRAMATE 200 MG/1
200 TABLET, COATED ORAL TWICE DAILY
Refills: 0 | Status: ACTIVE | COMMUNITY
Start: 2020-07-01

## 2020-07-01 NOTE — PHYSICAL EXAM
[General Appearance - Well Nourished] : well nourished [General Appearance - Well Developed] : well developed [Normal Appearance] : normal appearance [Normal Conjunctiva] : the conjunctiva exhibited no abnormalities [Normal Jugular Venous V Waves Present] : normal jugular venous V waves present [Normal Jugular Venous A Waves Present] : normal jugular venous A waves present [Heart Sounds] : normal S1 and S2 [Arterial Pulses Normal] : the arterial pulses were normal [Murmurs] : no murmurs present [Auscultation Breath Sounds / Voice Sounds] : lungs were clear to auscultation bilaterally [Edema] : no peripheral edema present [Respiration, Rhythm And Depth] : normal respiratory rhythm and effort [Abdomen Soft] : soft [Abdomen Tenderness] : non-tender [] : no hepato-splenomegaly [Abnormal Walk] : normal gait [Nail Clubbing] : no clubbing of the fingernails [Cyanosis, Localized] : no localized cyanosis [Skin Turgor] : normal skin turgor [Skin Color & Pigmentation] : normal skin color and pigmentation [Oriented To Time, Place, And Person] : oriented to person, place, and time [Affect] : the affect was normal [FreeTextEntry1] : wears mask

## 2020-07-01 NOTE — HISTORY OF PRESENT ILLNESS
[FreeTextEntry1] : 55 y/o female with PMHx of ?TIA , Sjogren's syndrome, who is here for cardiovascular risk stratification \par She had history of death at young age in her family ( Brother 47 and father 52) , \par She is concerned that her heart rate increases at times during one minute at rest (as indicated by her watches) but she is asymptomatic at times\par she bikes for 10 miles and walks for 2 miles and had no exertional chest pain \par She occasionally while mopping or sweeping will experience chest pain pressure , few seconds , relieved by sitting down.

## 2020-07-01 NOTE — ASSESSMENT
[FreeTextEntry1] : Tachycardia noticed on monitoring HR on watches , patient not symptomatic during these episodes\par will order holter monitor to evaluate \par \par Prior cath shows no obstructive HD

## 2020-07-09 ENCOUNTER — TRANSCRIPTION ENCOUNTER (OUTPATIENT)
Age: 55
End: 2020-07-09

## 2020-07-14 LAB
25(OH)D3 SERPL-MCNC: 52.5 NG/ML
ALBUMIN MFR SERPL ELPH: 63.5 %
ALBUMIN SERPL ELPH-MCNC: 4.5 G/DL
ALBUMIN SERPL-MCNC: 4.3 G/DL
ALBUMIN/GLOB SERPL: 1.7 RATIO
ALP BLD-CCNC: 75 U/L
ALPHA1 GLOB MFR SERPL ELPH: 3.2 %
ALPHA1 GLOB SERPL ELPH-MCNC: 0.2 G/DL
ALPHA2 GLOB MFR SERPL ELPH: 8.1 %
ALPHA2 GLOB SERPL ELPH-MCNC: 0.6 G/DL
ALT SERPL-CCNC: 21 U/L
ANA SER IF-ACNC: NEGATIVE
ANION GAP SERPL CALC-SCNC: 13 MMOL/L
AST SERPL-CCNC: 21 U/L
B-GLOBULIN MFR SERPL ELPH: 11.7 %
B-GLOBULIN SERPL ELPH-MCNC: 0.8 G/DL
BASOPHILS # BLD AUTO: 0.05 K/UL
BASOPHILS NFR BLD AUTO: 0.9 %
BILIRUB SERPL-MCNC: <0.2 MG/DL
BUN SERPL-MCNC: 20 MG/DL
C3 SERPL-MCNC: 88 MG/DL
C4 SERPL-MCNC: 20 MG/DL
CALCIUM SERPL-MCNC: 9.7 MG/DL
CCP AB SER IA-ACNC: <8 UNITS
CENTROMERE IGG SER-ACNC: <0.2 CD:130001892
CHLORIDE SERPL-SCNC: 108 MMOL/L
CHROMATIN AB SERPL-ACNC: <0.2 AL
CO2 SERPL-SCNC: 24 MMOL/L
CREAT SERPL-MCNC: 0.94 MG/DL
CREAT SPEC-SCNC: 115 MG/DL
CREAT/PROT UR: 0.1 RATIO
CRP SERPL-MCNC: <0.1 MG/DL
DSDNA AB SER-ACNC: <12 IU/ML
ENA RNP AB SER IA-ACNC: <0.2 AL
ENA RNP AB SER IA-ACNC: <0.2 AL
ENA SM AB SER IA-ACNC: <0.2 AL
ENA SS-A AB SER IA-ACNC: <0.2 AL
ENA SS-B AB SER IA-ACNC: <0.2 AL
EOSINOPHIL # BLD AUTO: 0.15 K/UL
EOSINOPHIL NFR BLD AUTO: 2.7 %
ERYTHROCYTE [SEDIMENTATION RATE] IN BLOOD BY WESTERGREN METHOD: 13 MM/HR
GAMMA GLOB FLD ELPH-MCNC: 0.9 G/DL
GAMMA GLOB MFR SERPL ELPH: 13.5 %
GLUCOSE SERPL-MCNC: 105 MG/DL
HAV IGM SER QL: NONREACTIVE
HBV CORE IGG+IGM SER QL: NONREACTIVE
HBV CORE IGM SER QL: NONREACTIVE
HBV E AB SER QL: NEGATIVE
HBV E AG SER QL: NEGATIVE
HBV SURFACE AB SER QL: NONREACTIVE
HBV SURFACE AG SER QL: NONREACTIVE
HCT VFR BLD CALC: 42.6 %
HEPATITIS A IGG ANTIBODY: NONREACTIVE
HEPATITIS A IGG ANTIBODY: NONREACTIVE
HGB BLD-MCNC: 13.4 G/DL
IGA 24H UR QL IFE: NORMAL
IMM GRANULOCYTES NFR BLD AUTO: 0.2 %
INTERPRETATION SERPL IEP-IMP: NORMAL
LYMPHOCYTES # BLD AUTO: 2.47 K/UL
LYMPHOCYTES NFR BLD AUTO: 43.7 %
M PROTEIN SPEC IFE-MCNC: NORMAL
M TB IFN-G BLD-IMP: NEGATIVE
MAN DIFF?: NORMAL
MCHC RBC-ENTMCNC: 29.5 PG
MCHC RBC-ENTMCNC: 31.5 GM/DL
MCV RBC AUTO: 93.6 FL
MONOCYTES # BLD AUTO: 0.4 K/UL
MONOCYTES NFR BLD AUTO: 7.1 %
NEUTROPHILS # BLD AUTO: 2.57 K/UL
NEUTROPHILS NFR BLD AUTO: 45.4 %
PLATELET # BLD AUTO: 235 K/UL
POTASSIUM SERPL-SCNC: 4.2 MMOL/L
PROT SERPL-MCNC: 6.8 G/DL
PROT UR-MCNC: 6 MG/DL
QUANTIFERON TB PLUS MITOGEN MINUS NIL: 7.69 IU/ML
QUANTIFERON TB PLUS NIL: 0.01 IU/ML
QUANTIFERON TB PLUS TB1 MINUS NIL: 0 IU/ML
QUANTIFERON TB PLUS TB2 MINUS NIL: 0 IU/ML
RBC # BLD: 4.55 M/UL
RBC # FLD: 12.7 %
RF+CCP IGG SER-IMP: NEGATIVE
RHEUMATOID FACT SER QL: 13 IU/ML
SARS-COV-2 IGG SERPL IA-ACNC: <0.1 INDEX
SARS-COV-2 IGG SERPL QL IA: NEGATIVE
SODIUM SERPL-SCNC: 145 MMOL/L
THYROGLOB AB SERPL-ACNC: <20 IU/ML
THYROPEROXIDASE AB SERPL IA-ACNC: <10 IU/ML
TSH SERPL-ACNC: 5.74 UIU/ML
WBC # FLD AUTO: 5.65 K/UL

## 2020-09-15 ENCOUNTER — TRANSCRIPTION ENCOUNTER (OUTPATIENT)
Age: 55
End: 2020-09-15

## 2021-04-16 ENCOUNTER — TRANSCRIPTION ENCOUNTER (OUTPATIENT)
Age: 56
End: 2021-04-16

## 2021-08-04 ENCOUNTER — NON-APPOINTMENT (OUTPATIENT)
Age: 56
End: 2021-08-04

## 2021-08-04 ENCOUNTER — APPOINTMENT (OUTPATIENT)
Dept: CARDIOLOGY | Facility: CLINIC | Age: 56
End: 2021-08-04
Payer: MEDICARE

## 2021-08-04 VITALS
HEART RATE: 103 BPM | OXYGEN SATURATION: 97 % | SYSTOLIC BLOOD PRESSURE: 125 MMHG | WEIGHT: 144 LBS | BODY MASS INDEX: 26.5 KG/M2 | TEMPERATURE: 98.2 F | DIASTOLIC BLOOD PRESSURE: 78 MMHG | HEIGHT: 62 IN

## 2021-08-04 DIAGNOSIS — R06.02 SHORTNESS OF BREATH: ICD-10-CM

## 2021-08-04 PROCEDURE — 99214 OFFICE O/P EST MOD 30 MIN: CPT

## 2021-08-04 PROCEDURE — 93225 XTRNL ECG REC<48 HRS REC: CPT

## 2021-08-04 PROCEDURE — 93000 ELECTROCARDIOGRAM COMPLETE: CPT | Mod: 59

## 2021-08-17 ENCOUNTER — APPOINTMENT (OUTPATIENT)
Dept: CARDIOLOGY | Facility: CLINIC | Age: 56
End: 2021-08-17
Payer: MEDICARE

## 2021-08-17 PROCEDURE — 93306 TTE W/DOPPLER COMPLETE: CPT

## 2021-08-18 ENCOUNTER — APPOINTMENT (OUTPATIENT)
Dept: CARDIOLOGY | Facility: CLINIC | Age: 56
End: 2021-08-18

## 2021-08-20 ENCOUNTER — TRANSCRIPTION ENCOUNTER (OUTPATIENT)
Age: 56
End: 2021-08-20

## 2021-08-22 ENCOUNTER — NON-APPOINTMENT (OUTPATIENT)
Age: 56
End: 2021-08-22

## 2021-08-22 LAB
DEPRECATED D DIMER PPP IA-ACNC: <150 NG/ML DDU
TSH SERPL-ACNC: 3.9 UIU/ML

## 2021-08-25 ENCOUNTER — NON-APPOINTMENT (OUTPATIENT)
Age: 56
End: 2021-08-25

## 2021-08-25 ENCOUNTER — APPOINTMENT (OUTPATIENT)
Dept: CARDIOLOGY | Facility: CLINIC | Age: 56
End: 2021-08-25
Payer: MEDICARE

## 2021-08-25 VITALS — SYSTOLIC BLOOD PRESSURE: 94 MMHG | HEART RATE: 87 BPM | DIASTOLIC BLOOD PRESSURE: 70 MMHG

## 2021-08-25 VITALS
SYSTOLIC BLOOD PRESSURE: 110 MMHG | HEIGHT: 62 IN | BODY MASS INDEX: 25.4 KG/M2 | DIASTOLIC BLOOD PRESSURE: 74 MMHG | OXYGEN SATURATION: 98 % | HEART RATE: 95 BPM | TEMPERATURE: 99.2 F | WEIGHT: 138 LBS

## 2021-08-25 VITALS — DIASTOLIC BLOOD PRESSURE: 76 MMHG | HEART RATE: 101 BPM | SYSTOLIC BLOOD PRESSURE: 98 MMHG

## 2021-08-25 DIAGNOSIS — R00.0 TACHYCARDIA, UNSPECIFIED: ICD-10-CM

## 2021-08-25 DIAGNOSIS — Z86.73 PERSONAL HISTORY OF TRANSIENT ISCHEMIC ATTACK (TIA), AND CEREBRAL INFARCTION W/OUT RESIDUAL DEFICITS: ICD-10-CM

## 2021-08-25 DIAGNOSIS — R07.89 OTHER CHEST PAIN: ICD-10-CM

## 2021-08-25 LAB
CHOLEST SERPL-MCNC: 182 MG/DL
HDLC SERPL-MCNC: 48 MG/DL
LDLC SERPL CALC-MCNC: 100 MG/DL
NONHDLC SERPL-MCNC: 135 MG/DL
TRIGL SERPL-MCNC: 175 MG/DL

## 2021-08-25 PROCEDURE — 99214 OFFICE O/P EST MOD 30 MIN: CPT

## 2021-08-25 RX ORDER — METOPROLOL TARTRATE 50 MG/1
50 TABLET, FILM COATED ORAL
Qty: 2 | Refills: 0 | Status: ACTIVE | COMMUNITY
Start: 2021-08-25 | End: 1900-01-01

## 2021-09-09 ENCOUNTER — NON-APPOINTMENT (OUTPATIENT)
Age: 56
End: 2021-09-09

## 2021-09-09 LAB
ANION GAP SERPL CALC-SCNC: 12 MMOL/L
BUN SERPL-MCNC: 26 MG/DL
CALCIUM SERPL-MCNC: 9.6 MG/DL
CHLORIDE SERPL-SCNC: 111 MMOL/L
CO2 SERPL-SCNC: 21 MMOL/L
CREAT SERPL-MCNC: 1.04 MG/DL
GLUCOSE SERPL-MCNC: 117 MG/DL
POTASSIUM SERPL-SCNC: 4.3 MMOL/L
SODIUM SERPL-SCNC: 144 MMOL/L

## 2021-09-10 ENCOUNTER — OUTPATIENT (OUTPATIENT)
Dept: OUTPATIENT SERVICES | Facility: HOSPITAL | Age: 56
LOS: 1 days | End: 2021-09-10
Payer: MEDICARE

## 2021-09-10 ENCOUNTER — NON-APPOINTMENT (OUTPATIENT)
Age: 56
End: 2021-09-10

## 2021-09-10 DIAGNOSIS — R00.0 TACHYCARDIA, UNSPECIFIED: ICD-10-CM

## 2021-09-10 DIAGNOSIS — Z98.890 OTHER SPECIFIED POSTPROCEDURAL STATES: Chronic | ICD-10-CM

## 2021-09-10 DIAGNOSIS — Z90.710 ACQUIRED ABSENCE OF BOTH CERVIX AND UTERUS: Chronic | ICD-10-CM

## 2021-09-10 DIAGNOSIS — Z41.9 ENCOUNTER FOR PROCEDURE FOR PURPOSES OTHER THAN REMEDYING HEALTH STATE, UNSPECIFIED: Chronic | ICD-10-CM

## 2021-09-10 PROCEDURE — 75574 CT ANGIO HRT W/3D IMAGE: CPT

## 2021-09-10 PROCEDURE — 75574 CT ANGIO HRT W/3D IMAGE: CPT | Mod: 26,MH

## 2021-09-15 ENCOUNTER — NON-APPOINTMENT (OUTPATIENT)
Age: 56
End: 2021-09-15

## 2021-09-15 ENCOUNTER — APPOINTMENT (OUTPATIENT)
Dept: CARDIOLOGY | Facility: CLINIC | Age: 56
End: 2021-09-15

## 2021-09-15 NOTE — HISTORY OF PRESENT ILLNESS
[Home] : at home, [unfilled] , at the time of the visit. [Medical Office: ([facility])___] : at the medical office located in  [Verbal consent obtained from patient] : the patient, [unfilled] [FreeTextEntry4] : BYRON GAMINO

## 2021-10-07 PROBLEM — R07.89 CHEST PAIN, ATYPICAL: Status: ACTIVE | Noted: 2021-08-25

## 2021-10-07 PROBLEM — Z86.73 HISTORY OF TIA (TRANSIENT ISCHEMIC ATTACK): Status: ACTIVE | Noted: 2020-06-12

## 2021-10-07 PROBLEM — R00.0 TACHYCARDIA: Status: ACTIVE | Noted: 2020-07-01

## 2021-11-03 ENCOUNTER — APPOINTMENT (OUTPATIENT)
Dept: CARDIOLOGY | Facility: CLINIC | Age: 56
End: 2021-11-03

## 2022-04-13 ENCOUNTER — APPOINTMENT (OUTPATIENT)
Dept: ORTHOPEDIC SURGERY | Facility: CLINIC | Age: 57
End: 2022-04-13
Payer: MEDICAID

## 2022-04-13 VITALS
BODY MASS INDEX: 23.33 KG/M2 | WEIGHT: 130 LBS | DIASTOLIC BLOOD PRESSURE: 81 MMHG | SYSTOLIC BLOOD PRESSURE: 118 MMHG | HEART RATE: 108 BPM | HEIGHT: 62.5 IN

## 2022-04-13 PROCEDURE — 73030 X-RAY EXAM OF SHOULDER: CPT | Mod: LT

## 2022-04-13 PROCEDURE — 99203 OFFICE O/P NEW LOW 30 MIN: CPT

## 2022-04-13 RX ORDER — DICLOFENAC SODIUM 50 MG/1
50 TABLET, DELAYED RELEASE ORAL
Qty: 60 | Refills: 0 | Status: ACTIVE | COMMUNITY
Start: 2022-04-13 | End: 1900-01-01

## 2022-04-13 NOTE — CONSULT LETTER
[Dear  ___] : Dear  [unfilled], [Consult Letter:] : I had the pleasure of evaluating your patient, [unfilled]. [( Thank you for referring [unfilled] for consultation for _____ )] : Thank you for referring [unfilled] for consultation for [unfilled] [Please see my note below.] : Please see my note below. [Consult Closing:] : Thank you very much for allowing me to participate in the care of this patient.  If you have any questions, please do not hesitate to contact me. [Sincerely,] : Sincerely, [FreeTextEntry2] : CONCHIS BAXTER\par  [FreeTextEntry3] : Timothy Tate MD\par Orthopaedic Surgery\par Primary/Revision Hip & Knee Orthoplasty\par [facility]\par \par [facility] \par [address] \par [address] \[place] [address]\par \par Tel [phone]\par Fax [phone]\par \par For same day and next day orthopedic appointments contact:\par [email or web address] |[phone])\par Appointments available nights and weekends!  \par \par [facility]\par Visit us at [facility]/orthopaedic\par

## 2022-04-13 NOTE — PHYSICAL EXAM
[de-identified] : GENERAL APPEARANCE: Well nourished and hydrated, pleasant, alert, and oriented x 3. Appears their stated age. \par HEENT: Normocephalic, extraocular eye motion intact. Nasal septum midline. Oral cavity clear. External auditory canal clear. \par RESPIRATORY: Breath sounds clear and audible in all lobes. No wheezing, No accessory muscle use.\par CARDIOVASCULAR: No apparent abnormalities. No lower leg edema. No varicosities. Pedal pulses are palpable.\par NEUROLOGIC: Sensation is normal, no muscle weakness in the upper or lower extremities.\par DERMATOLOGIC: No apparent skin lesions, moist, warm, no rash.\par SPINE: Cervical spine appears normal and moves freely; thoracic spine appears normal and moves freely; lumbosacral spine appears normal and moves freely, normal, nontender.\par MUSCULOSKELETAL: Hands, wrists, and elbows are normal and move freely, \par Psychiatric: Oriented to person, place, and time, insight and judgement were intact and the affect was normal. \par Left upper extremity: Full active and passive range of motion of left shoulder with pains at extreme, mildly positive cross chest, positive empty can, positive Van Buren's, negative belly press, AIN PIN median radial ulnar nerves intact, fingers warm well-perfused with cap refill, no palpable masses [de-identified] : 4 views left shoulder obtained the office today show no acute fracture or dislocation.  No significant degenerative changes noted.  No bony lesions noted.

## 2022-04-13 NOTE — HISTORY OF PRESENT ILLNESS
[Pain Location] : pain [] : left shoulder [Worsening] : worsening [___ mths] : [unfilled] month(s) ago [1] : a current pain level of 1/10 [4] : an average pain level of 4/10 [2] : a minimum pain level of 2/10 [6] : a maximum pain level of 6/10 [Intermit.] : ~He/She~ states the symptoms seem to be intermittent [Direct Pressure] : worsened by direct pressure [Lifting] : worsened by lifting [Rest] : relieved by rest [de-identified] : 04/13/2022  \par Ms. MICHAELS is a pleasant 56 year old female who presents with Left shoulder pain. \par \par Patient is a 56-year-old female present with chief complaint of left shoulder pain has been going over the past 3 to 6 months.  Patient denies any history of trauma however she states she does have pain in the shoulder that radiates down the lateral side of the shoulder.  States it hurts at night as well as with doing overhead activities and behind the back activities.  States that she has a history of a brother who has multiple myeloma.  Denies any fevers chills or constitutional symptoms.  Is not currently taking any pain medicines for this.  Denies mechanical symptoms such as locking popping or buckling.  Denies any neck pain or neurovascular compromise.

## 2022-04-13 NOTE — DISCUSSION/SUMMARY
[Medication Risks Reviewed] : Medication risks reviewed [de-identified] : Patient is a 56-year-old female with left shoulder rotator cuff probable tear presenting today for initial evaluation.  She is having mild symptoms in her shoulder and I do think she has a likely rotator cuff tear.  I therefore recommended conservative treatment this time.  I given her prescription for physical therapy.  Have also recommended diclofenac 50 mg twice daily.  I will see her back in 6 to 8 weeks for repeat evaluation.  If no improvement or symptoms we will consider an MRI at that time.  All questions were asked and answered.

## 2022-04-19 ENCOUNTER — APPOINTMENT (OUTPATIENT)
Dept: NEUROSURGERY | Facility: CLINIC | Age: 57
End: 2022-04-19
Payer: MEDICAID

## 2022-04-19 VITALS
DIASTOLIC BLOOD PRESSURE: 81 MMHG | OXYGEN SATURATION: 98 % | SYSTOLIC BLOOD PRESSURE: 119 MMHG | HEART RATE: 99 BPM | WEIGHT: 130 LBS | TEMPERATURE: 98.2 F | HEIGHT: 62.5 IN | BODY MASS INDEX: 23.33 KG/M2

## 2022-04-19 DIAGNOSIS — I63.9 CEREBRAL INFARCTION, UNSPECIFIED: ICD-10-CM

## 2022-04-19 PROCEDURE — 99205 OFFICE O/P NEW HI 60 MIN: CPT

## 2022-04-19 NOTE — REVIEW OF SYSTEMS
[Feeling Tired] : feeling tired [Confused or Disoriented] : confusion [Memory Lapses or Loss] : memory loss [Decr. Concentrating Ability] : decreased concentrating ability [Hand Weakness] :  hand weakness [Negative] : Heme/Lymph

## 2022-04-20 NOTE — PHYSICAL EXAM
[General Appearance - Alert] : alert [General Appearance - In No Acute Distress] : in no acute distress [Oriented To Time, Place, And Person] : oriented to person, place, and time [Impaired Insight] : insight and judgment were intact [Affect] : the affect was normal [Person] : oriented to person [Place] : oriented to place [Time] : oriented to time [Short Term Intact] : short term memory intact [Remote Intact] : remote memory intact [Span Intact] : the attention span was normal [Concentration Intact] : normal concentrating ability [Fluency] : fluency intact [Comprehension] : comprehension intact [Current Events] : adequate knowledge of current events [Past History] : adequate knowledge of personal past history [Vocabulary] : adequate range of vocabulary [Cranial Nerves Optic (II)] : visual acuity intact bilaterally,  pupils equal round and reactive to light [Cranial Nerves Oculomotor (III)] : extraocular motion intact [Cranial Nerves Trigeminal (V)] : facial sensation intact symmetrically [Cranial Nerves Facial (VII)] : face symmetrical [Cranial Nerves Vestibulocochlear (VIII)] : hearing was intact bilaterally [Cranial Nerves Glossopharyngeal (IX)] : tongue and palate midline [Cranial Nerves Accessory (XI - Cranial And Spinal)] : head turning and shoulder shrug symmetric [Cranial Nerves Hypoglossal (XII)] : there was no tongue deviation with protrusion [Motor Tone] : muscle tone was normal in all four extremities [Motor Strength] : muscle strength was normal in all four extremities [No Muscle Atrophy] : normal bulk in all four extremities [Motor Handedness Right-Handed] : the patient is right hand dominant [Sensation Tactile Decrease] : light touch was intact [Balance] : balance was intact [Extraocular Movements] : extraocular movements were intact [Outer Ear] : the ears and nose were normal in appearance [Neck Appearance] : the appearance of the neck was normal [] : no respiratory distress [Respiration, Rhythm And Depth] : normal respiratory rhythm and effort [Exaggerated Use Of Accessory Muscles For Inspiration] : no accessory muscle use [Heart Rate And Rhythm] : heart rate was normal and rhythm regular [Abnormal Walk] : normal gait [Involuntary Movements] : no involuntary movements were seen [Skin Color & Pigmentation] : normal skin color and pigmentation [Skin Turgor] : normal skin turgor [Past-pointing] : there was no past-pointing [Tremor] : no tremor present [Coordination - Dysmetria Impaired Finger-to-Nose Bilateral] : not present [FreeTextEntry6] : no drift

## 2022-04-20 NOTE — HISTORY OF PRESENT ILLNESS
[de-identified] : 56 year old ambidextrous female presents to the office at the request of her PCP and nurse practitioner after having recent MRI brain at [facility].\par SHe has a history of stroke from 20 years ago and has been experiencing memory and cognitive changes since. 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 It is unclear the origin of stroke since images were done outside of our system. She was following with neurology Dr. Turner, for a few years, 2104, 2015 and 2016. \par For the past few years, her symptoms have worsened and her behavior and coping skills have changes as per pt. She was recently diagnosed with PFO and Sjorgens. SHe had a brain MRI at [facility] last Saturday, but did not bring imaging in for review. Report was unimpressionable. \par She states she feels weak, imbalance, decreased strength in both hands and coordination is off. \par \par Plan: CTA head and neck w [facility] location\par

## 2022-04-20 NOTE — ASSESSMENT
[FreeTextEntry1] : Impression:White matter disease probably related to microangiopathy. Her symptoms don’t seem to be related to a vascular etiology are diffuse, progressive and non-focal. I asked if she was evaluated for any demyelinating diseases she states she completed work up for MS. I asked if she was evaluated for any degenerative disease and she doesn’t have a specific answer. \par In general she expresses legit concern for this changes and tells me to please not attribute them to stress or depression. I explain to her that as a vascular neurologist my advice was that I did not think this changes are related to a problem with the brain vasculature, however will order CT angiogram. I advise her to seek evaluation perhaps by a behavioral neurologist. \par \par Continue BP medications as ordered to maintained BP <140/90. \par Continue statin therapy with an LDL goal of < 70 for secondary stroke prevention.\par Continue follow up with neurology.\par Follow up after imaging CTA head and neck \par Patient has been given an opportunity to ask and have their questions answered to their satisfaction.\par Patient knows to call the office if there are any new or worsening symptoms.\par \par Stroke prevention requires management of risk factors and patient education. Risk factors include smoking, excess weight, hypertension, dyslipidemia, heavy alcohol use, physical inactivity, obesity and diabetes. Blood pressure should be < 140/90. Lipid- lowering agents may reduce risk of stroke.  patient to maintain regular exercise and body weight and control intake of alcohol.\par \par \par \par \par \par \par \par \par  \par

## 2022-06-01 ENCOUNTER — APPOINTMENT (OUTPATIENT)
Dept: ORTHOPEDIC SURGERY | Facility: CLINIC | Age: 57
End: 2022-06-01
Payer: MEDICAID

## 2022-06-01 VITALS
SYSTOLIC BLOOD PRESSURE: 122 MMHG | HEART RATE: 78 BPM | WEIGHT: 130 LBS | HEIGHT: 62.5 IN | DIASTOLIC BLOOD PRESSURE: 78 MMHG | BODY MASS INDEX: 23.33 KG/M2

## 2022-06-01 DIAGNOSIS — M67.912 UNSPECIFIED DISORDER OF SYNOVIUM AND TENDON, LEFT SHOULDER: ICD-10-CM

## 2022-06-01 PROCEDURE — 99214 OFFICE O/P EST MOD 30 MIN: CPT

## 2022-06-01 NOTE — REVIEW OF SYSTEMS
[Joint Pain] : joint pain [Joint Stiffness] : joint stiffness [Negative] : Heme/Lymph [FreeTextEntry9] : left shoulder

## 2022-06-01 NOTE — PHYSICAL EXAM
[de-identified] : Left upper extremity: Full active and passive range of motion of left shoulder with pains at extreme, mildly positive cross chest, positive empty can, positive Traill's, negative belly press, AIN PIN median radial ulnar nerves intact, fingers warm well-perfused with cap refill, no palpable masses

## 2022-06-01 NOTE — DISCUSSION/SUMMARY
[Medication Risks Reviewed] : Medication risks reviewed [de-identified] : Patient is a 56-year-old female presenting today for follow-up of her left shoulder and arm pain.  She still having continued discomfort and pain despite conservative treatment including directed at home exercises as well as NSAID use.  I have therefore an MRI of her shoulder and arm for further evaluation management of the pain discomfort that she is having.  She should continue with the at home exercises.  She is continue take the NSAIDs as needed for the pain.  I will see her back after the MRI for repeat evaluation.  All questions were asked and answered.

## 2022-06-01 NOTE — HISTORY OF PRESENT ILLNESS
[Pain Location] : pain [] : left arm [Worsening] : worsening [9] : a minimum pain level of 9/10 [10] : a maximum pain level of 10/10 [Constant] : ~He/She~ states the symptoms seem to be constant [Direct Pressure] : worsened by direct pressure [Lifting] : worsened by lifting [Rest] : relieved by rest [de-identified] : 06/01/2022 \par Ms. MICHAELS is a pleasant 56 year old HD [R & L] female who is being seen for follow-up for left shoulder pain. \par \par Patient is a 56-year-old female presenting for follow-up of her left shoulder and arm pain.  Patient states that she is having continued pain in the shoulder that radiates to the lateral arm despite doing her directed home exercise that she learned from physical therapy for her right arm since her last visit as well as taking Tylenol and NSAIDs for the pain.  States she still has severe pain with overhead activities.  States it also hurts now with rest.  States that she has a history of cancer in her family with her brother and she would like an MRI of her shoulder and arm due to the pain and worrying about the possibility of having this family history of cancer.  Denies any constitutional symptoms.

## 2022-06-20 ENCOUNTER — APPOINTMENT (OUTPATIENT)
Dept: DERMATOLOGY | Facility: CLINIC | Age: 57
End: 2022-06-20

## 2022-06-30 ENCOUNTER — APPOINTMENT (OUTPATIENT)
Dept: ORTHOPEDIC SURGERY | Facility: CLINIC | Age: 57
End: 2022-06-30

## 2022-06-30 VITALS
DIASTOLIC BLOOD PRESSURE: 81 MMHG | HEART RATE: 96 BPM | BODY MASS INDEX: 23.04 KG/M2 | HEIGHT: 63 IN | WEIGHT: 130 LBS | SYSTOLIC BLOOD PRESSURE: 113 MMHG | TEMPERATURE: 98.1 F

## 2022-06-30 PROCEDURE — 99214 OFFICE O/P EST MOD 30 MIN: CPT | Mod: 25

## 2022-06-30 PROCEDURE — 20610 DRAIN/INJ JOINT/BURSA W/O US: CPT | Mod: LT

## 2022-06-30 RX ORDER — CELECOXIB 200 MG/1
200 CAPSULE ORAL TWICE DAILY
Qty: 60 | Refills: 0 | Status: ACTIVE | COMMUNITY
Start: 2022-06-30 | End: 1900-01-01

## 2022-06-30 NOTE — PHYSICAL EXAM
[de-identified] : Left upper extremity: Active range of motion limited to 90 degrees with pain passive range of motion to 110 external rotation to 30 internal rotation to 20, mildly positive cross chest, positive empty can, positive Sharkey's, negative belly press, AIN PIN median radial ulnar nerves intact, fingers warm well-perfused with cap refill, no palpable masses [de-identified] : MRI of the left shoulder dated 6/10/2022 shows mild to moderate adhesive capsulitis with thickening and hyperintensity of the joint capsule.  Mild supraspinatus tendinosis.  No rotator cuff tear.\par \par MRI of the left humerus dated 6/10/2022 shows unremarkable MRI of the left upper extremity.

## 2022-06-30 NOTE — DISCUSSION/SUMMARY
[Medication Risks Reviewed] : Medication risks reviewed [de-identified] : Patient is a 56 a female with adhesive capsulitis of the left shoulder presenting today for follow-up.  She has had worsening range of motion and pain since her last visit.  She has not yet been to formal physical therapy.  She has been having some relief of the pain with Celebrex.  I therefore gave her a left shoulder cortisone injection which she tolerated well.  I given her again a prescription for physical therapy and stressed the importance of this to her.  I recommend she continue taking the Celebrex and given her prescription for that.  I like to see her back in 6 weeks for range of motion check.  All questions asked and answered.

## 2022-06-30 NOTE — HISTORY OF PRESENT ILLNESS
[de-identified] : 56-year-old female here today for follow-up of her left shoulder pain.  Patient states that is been worsening since her last visit.  States that she has difficulty putting on close as well as reaching behind her back.  States that her range of motion has been greatly reduced.  Is having significant dysfunction.  Is here today for further evaluation after her MRI.  Has been taking Celebrex with some relief of the pain.

## 2022-06-30 NOTE — PROCEDURE
[de-identified] : I injected the left shoulder.\par I discussed at length with the patient the planned steroid and lidocaine injection. The risks, benefits, convalescence and alternatives were reviewed. The possible side effects discussed included but were not limited to: pain, swelling, heat, bleeding, and redness. Symptoms are generally mild but if they are extensive then contact the office. Giving pain relievers by mouth such as NSAIDs or Tylenol can generally treat the reactions to steroid and lidocaine. Rare cases of infection have been noted. Rash, hives and itching may occur post injection. If you have muscle pain or cramps, flushing and or swelling of the face, rapid heart beat, nausea, dizziness, fever, chills, headache, difficulty breathing, swelling in the arms or legs, or have a prickly feeling of your skin, contact a health care provider immediately. Following this discussion, the shoulder was prepped with Alcohol and under sterile condition the 80 mg Depo-Medrol and 6 cc Lidocaine injection was performed with a 20 gauge needle. The needle was introduced into the joint, aspiration was performed to ensure intra-articular placement and the medication was injected. Upon withdrawal of the needle the site was cleaned with alcohol and a band aid applied. The patient tolerated the injection well and there were no adverse effects. Post injection instructions included no strenuous activity for 24 hours, cryotherapy and if there are any adverse effects to contact the office.

## 2022-07-19 RX ORDER — CELECOXIB 200 MG/1
200 CAPSULE ORAL TWICE DAILY
Qty: 60 | Refills: 0 | Status: ACTIVE | COMMUNITY
Start: 2022-07-19 | End: 1900-01-01

## 2022-08-03 ENCOUNTER — APPOINTMENT (OUTPATIENT)
Dept: CARDIOLOGY | Facility: CLINIC | Age: 57
End: 2022-08-03

## 2022-08-03 ENCOUNTER — EMERGENCY (EMERGENCY)
Facility: HOSPITAL | Age: 57
LOS: 1 days | Discharge: HOME | End: 2022-08-03
Attending: STUDENT IN AN ORGANIZED HEALTH CARE EDUCATION/TRAINING PROGRAM
Payer: MEDICAID

## 2022-08-03 ENCOUNTER — TRANSCRIPTION ENCOUNTER (OUTPATIENT)
Age: 57
End: 2022-08-03

## 2022-08-03 VITALS
OXYGEN SATURATION: 96 % | SYSTOLIC BLOOD PRESSURE: 122 MMHG | HEIGHT: 63 IN | DIASTOLIC BLOOD PRESSURE: 83 MMHG | RESPIRATION RATE: 22 BRPM | TEMPERATURE: 99 F | HEART RATE: 90 BPM

## 2022-08-03 DIAGNOSIS — Z90.710 ACQUIRED ABSENCE OF BOTH CERVIX AND UTERUS: Chronic | ICD-10-CM

## 2022-08-03 DIAGNOSIS — Z98.890 OTHER SPECIFIED POSTPROCEDURAL STATES: Chronic | ICD-10-CM

## 2022-08-03 DIAGNOSIS — Z41.9 ENCOUNTER FOR PROCEDURE FOR PURPOSES OTHER THAN REMEDYING HEALTH STATE, UNSPECIFIED: Chronic | ICD-10-CM

## 2022-08-03 LAB
ALBUMIN SERPL ELPH-MCNC: 4.7 G/DL — SIGNIFICANT CHANGE UP (ref 3.3–5.2)
ALP SERPL-CCNC: 61 U/L — SIGNIFICANT CHANGE UP (ref 40–120)
ALT FLD-CCNC: 28 U/L — SIGNIFICANT CHANGE UP
ANION GAP SERPL CALC-SCNC: 11 MMOL/L — SIGNIFICANT CHANGE UP (ref 5–17)
AST SERPL-CCNC: 22 U/L — SIGNIFICANT CHANGE UP
BASOPHILS # BLD AUTO: 0.08 K/UL — SIGNIFICANT CHANGE UP (ref 0–0.2)
BASOPHILS NFR BLD AUTO: 1 % — SIGNIFICANT CHANGE UP (ref 0–2)
BILIRUB SERPL-MCNC: <0.2 MG/DL — LOW (ref 0.4–2)
BUN SERPL-MCNC: 25.5 MG/DL — HIGH (ref 8–20)
CALCIUM SERPL-MCNC: 9.6 MG/DL — SIGNIFICANT CHANGE UP (ref 8.4–10.5)
CHLORIDE SERPL-SCNC: 109 MMOL/L — HIGH (ref 98–107)
CO2 SERPL-SCNC: 22 MMOL/L — SIGNIFICANT CHANGE UP (ref 22–29)
CREAT SERPL-MCNC: 0.98 MG/DL — SIGNIFICANT CHANGE UP (ref 0.5–1.3)
EGFR: 69 ML/MIN/1.73M2 — SIGNIFICANT CHANGE UP
EOSINOPHIL # BLD AUTO: 0.17 K/UL — SIGNIFICANT CHANGE UP (ref 0–0.5)
EOSINOPHIL NFR BLD AUTO: 2.2 % — SIGNIFICANT CHANGE UP (ref 0–6)
GLUCOSE SERPL-MCNC: 97 MG/DL — SIGNIFICANT CHANGE UP (ref 70–99)
HCT VFR BLD CALC: 42.5 % — SIGNIFICANT CHANGE UP (ref 34.5–45)
HGB BLD-MCNC: 13.8 G/DL — SIGNIFICANT CHANGE UP (ref 11.5–15.5)
IMM GRANULOCYTES NFR BLD AUTO: 0.3 % — SIGNIFICANT CHANGE UP (ref 0–1.5)
LYMPHOCYTES # BLD AUTO: 1.87 K/UL — SIGNIFICANT CHANGE UP (ref 1–3.3)
LYMPHOCYTES # BLD AUTO: 24.4 % — SIGNIFICANT CHANGE UP (ref 13–44)
MCHC RBC-ENTMCNC: 29.8 PG — SIGNIFICANT CHANGE UP (ref 27–34)
MCHC RBC-ENTMCNC: 32.5 GM/DL — SIGNIFICANT CHANGE UP (ref 32–36)
MCV RBC AUTO: 91.8 FL — SIGNIFICANT CHANGE UP (ref 80–100)
MONOCYTES # BLD AUTO: 0.52 K/UL — SIGNIFICANT CHANGE UP (ref 0–0.9)
MONOCYTES NFR BLD AUTO: 6.8 % — SIGNIFICANT CHANGE UP (ref 2–14)
NEUTROPHILS # BLD AUTO: 5 K/UL — SIGNIFICANT CHANGE UP (ref 1.8–7.4)
NEUTROPHILS NFR BLD AUTO: 65.3 % — SIGNIFICANT CHANGE UP (ref 43–77)
PLATELET # BLD AUTO: 266 K/UL — SIGNIFICANT CHANGE UP (ref 150–400)
POTASSIUM SERPL-MCNC: 4 MMOL/L — SIGNIFICANT CHANGE UP (ref 3.5–5.3)
POTASSIUM SERPL-SCNC: 4 MMOL/L — SIGNIFICANT CHANGE UP (ref 3.5–5.3)
PROT SERPL-MCNC: 7.7 G/DL — SIGNIFICANT CHANGE UP (ref 6.6–8.7)
RBC # BLD: 4.63 M/UL — SIGNIFICANT CHANGE UP (ref 3.8–5.2)
RBC # FLD: 12.9 % — SIGNIFICANT CHANGE UP (ref 10.3–14.5)
SODIUM SERPL-SCNC: 142 MMOL/L — SIGNIFICANT CHANGE UP (ref 135–145)
TROPONIN T SERPL-MCNC: <0.01 NG/ML — SIGNIFICANT CHANGE UP (ref 0–0.06)
TROPONIN T SERPL-MCNC: <0.01 NG/ML — SIGNIFICANT CHANGE UP (ref 0–0.06)
WBC # BLD: 7.66 K/UL — SIGNIFICANT CHANGE UP (ref 3.8–10.5)
WBC # FLD AUTO: 7.66 K/UL — SIGNIFICANT CHANGE UP (ref 3.8–10.5)

## 2022-08-03 PROCEDURE — 99220: CPT

## 2022-08-03 PROCEDURE — 71045 X-RAY EXAM CHEST 1 VIEW: CPT | Mod: 26

## 2022-08-03 PROCEDURE — 93010 ELECTROCARDIOGRAM REPORT: CPT

## 2022-08-03 PROCEDURE — 93306 TTE W/DOPPLER COMPLETE: CPT | Mod: 26

## 2022-08-03 RX ORDER — TRAZODONE HCL 50 MG
100 TABLET ORAL AT BEDTIME
Refills: 0 | Status: DISCONTINUED | OUTPATIENT
Start: 2022-08-03 | End: 2022-08-08

## 2022-08-03 RX ORDER — SODIUM CHLORIDE 9 MG/ML
1000 INJECTION INTRAMUSCULAR; INTRAVENOUS; SUBCUTANEOUS ONCE
Refills: 0 | Status: COMPLETED | OUTPATIENT
Start: 2022-08-03 | End: 2022-08-03

## 2022-08-03 RX ORDER — METOPROLOL TARTRATE 50 MG
50 TABLET ORAL ONCE
Refills: 0 | Status: DISCONTINUED | OUTPATIENT
Start: 2022-08-04 | End: 2022-08-04

## 2022-08-03 RX ORDER — ASPIRIN/CALCIUM CARB/MAGNESIUM 324 MG
325 TABLET ORAL ONCE
Refills: 0 | Status: COMPLETED | OUTPATIENT
Start: 2022-08-03 | End: 2022-08-03

## 2022-08-03 RX ORDER — ATORVASTATIN CALCIUM 80 MG/1
20 TABLET, FILM COATED ORAL AT BEDTIME
Refills: 0 | Status: DISCONTINUED | OUTPATIENT
Start: 2022-08-03 | End: 2022-08-08

## 2022-08-03 RX ORDER — METOPROLOL TARTRATE 50 MG
100 TABLET ORAL ONCE
Refills: 0 | Status: COMPLETED | OUTPATIENT
Start: 2022-08-04 | End: 2022-08-04

## 2022-08-03 RX ADMIN — SODIUM CHLORIDE 1000 MILLILITER(S): 9 INJECTION INTRAMUSCULAR; INTRAVENOUS; SUBCUTANEOUS at 18:32

## 2022-08-03 RX ADMIN — ATORVASTATIN CALCIUM 20 MILLIGRAM(S): 80 TABLET, FILM COATED ORAL at 23:03

## 2022-08-03 RX ADMIN — SODIUM CHLORIDE 1000 MILLILITER(S): 9 INJECTION INTRAMUSCULAR; INTRAVENOUS; SUBCUTANEOUS at 17:34

## 2022-08-03 RX ADMIN — Medication 100 MILLIGRAM(S): at 22:48

## 2022-08-03 RX ADMIN — Medication 325 MILLIGRAM(S): at 18:42

## 2022-08-03 NOTE — ED PROVIDER NOTE - NSICDXPASTSURGICALHX_GEN_ALL_CORE_FT
PAST SURGICAL HISTORY:  History of hernia repair Sept 2017,  umbilical,  had exp.  lap as well    S/P hysterectomy     Surgery, other elective hysterectomy

## 2022-08-03 NOTE — ED CDU PROVIDER INITIAL DAY NOTE - OBJECTIVE STATEMENT
PT with SPMHX of HLD presents to the ED with complaint of CP x2wk. Pt states that she has had interment substernal chest pain that she describes as pressure, that has been not made better or worse by anything. Pt states that symptoms have become more frequent in the last 4 dyas and more sever. Pt states that pain has been sever and interment radiates to her jaw. Pt dines fever, chill, SOB, diff breathing, HA, dizziness, cough, back pain, N/V.

## 2022-08-03 NOTE — ED ADULT NURSE NOTE - OBJECTIVE STATEMENT
patient c/o misternal chest pain associated with occasional shortness of breath and pain with bending forward and breathing

## 2022-08-03 NOTE — ED PROVIDER NOTE - OBJECTIVE STATEMENT
56 y/ female with FH CAD comes in requesting evaluation for substernal chest pressure   sometimes radiates  to right jaw ,sometimes  associated with exertion , states her rate is higher than baseline , pt is scheduled for a stress test but is concerned   no associated signs or symptoms with pain     RF family history +

## 2022-08-03 NOTE — ED PROVIDER NOTE - NSICDXPASTMEDICALHX_GEN_ALL_CORE_FT
PAST MEDICAL HISTORY:  Anxiety     Asperger's disorder     CVA (cerebral infarction)     Heart abnormality hole    Sjogren-Syed syndrome

## 2022-08-03 NOTE — ED ADULT TRIAGE NOTE - BP NONINVASIVE SYSTOLIC (MM HG)
52 year old male with PMh HTn and HLD presents with chest pain. Pt states that his pain has been present x 6 months, but worse int he past 2 weeks. He reports a burning sensation in his left lower chest, no radiation, and states it come on when he sleeps and wakes him from sleep. No pain with deep inspiration, no pain with exertion and the pt states he exercises regularly and has had a negative stress test within the past year. He does report that when he wakes up with the pain he often gets a sour taste in his mouth, but not always. No SOB, diaphoresis, nausea, vomitign, diarrhea.
122

## 2022-08-03 NOTE — ED CDU PROVIDER INITIAL DAY NOTE - NS ED ROS FT
ROS: CONTUSIONAL: Denies fever, chills, fatigue, wt loss. HEAD: Denies trauma, HA, Dizziness. EYE: Denies Acute visual changes, diplopia. ENMT: Denies change in hearing, tinnitus, epistaxis, difficulty swallowing, sore throat. CARDIO: CP,  Denies palpitations, edema. RESP: Denies Cough, SOB , Diff breathing, hemoptysis. GI: Denies N/V, ABD pain, change in bowel movement. URINARY: Denies difficulty urinating, pelvic pain. MS:  Denies joint pain, back pain, weakness, decreased ROM, swelling. NEURO: Denies change in gait, seizures, loss of sensation, dizziness, confusion LOC.  PSY: NO SI/HI. SKIN: Denies Rash, bruising.

## 2022-08-03 NOTE — ED CDU PROVIDER INITIAL DAY NOTE - MEDICAL DECISION MAKING DETAILS
Pt with Sig family hx for early CAD and sudden cardiac death placed in obs for ischemic eval . PT seen BY OBS PA found to be appropriate CDU PT care transferred to PA.

## 2022-08-03 NOTE — ED PROVIDER NOTE - CROS ED CONS ALL NEG
Assumed care of patient at 0730. Patient unresponsive. Sinus tach on telemetry. IVF maintained per order. Became febrile during shift. PCP notified. Treated. UA sent. IV ABX administered per order. Hypertensive. Sepsis BPA fired. PCP paged. Plan of care updated with both sisters. Will cont to monitor. Problem: CARDIOVASCULAR - ADULT  Goal: Maintains optimal cardiac output and hemodynamic stability  Description: INTERVENTIONS:  - Monitor vital signs, rhythm, and trends  - Monitor for bleeding, hypotension and signs of decreased cardiac output  - Evaluate effectiveness of vasoactive medications to optimize hemodynamic stability  - Monitor arterial and/or venous puncture sites for bleeding and/or hematoma  - Assess quality of pulses, skin color and temperature  - Assess for signs of decreased coronary artery perfusion - ex. Angina  - Evaluate fluid balance, assess for edema, trend weights  Outcome: Progressing  Goal: Absence of cardiac arrhythmias or at baseline  Description: INTERVENTIONS:  - Continuous cardiac monitoring, monitor vital signs, obtain 12 lead EKG if indicated  - Evaluate effectiveness of antiarrhythmic and heart rate control medications as ordered  - Initiate emergency measures for life threatening arrhythmias  - Monitor electrolytes and administer replacement therapy as ordered  Outcome: Progressing     Problem: NEUROLOGICAL - ADULT  Goal: Achieves stable or improved neurological status  Description: INTERVENTIONS  - Assess for and report changes in neurological status  - Initiate measures to prevent increased intracranial pressure  - Maintain blood pressure and fluid volume within ordered parameters to optimize cerebral perfusion and minimize risk of hemorrhage  - Monitor temperature, glucose, and sodium.  Initiate appropriate interventions as ordered  Outcome: Progressing     Problem: Impaired Functional Mobility  Goal: Achieve highest/safest level of mobility/gait  Description: Interventions:  - Assess patient's functional ability and stability  - Promote increasing activity/tolerance for mobility and gait  - Educate and engage patient/family in tolerated activity level and precautions    Outcome: Progressing     Problem: Impaired Cognition  Goal: Patient will exhibit improved attention, thought processing and/or memory  Description: Interventions:    Outcome: Progressing - - -

## 2022-08-03 NOTE — ED CDU PROVIDER INITIAL DAY NOTE - NSICDXFAMILYHX_GEN_ALL_CORE_FT
FAMILY HISTORY:  No pertinent family history in first degree relatives     FAMILY HISTORY:  Father  Still living? No  Family history of early CAD, Age at diagnosis: 41-50    Sibling  Still living? No  Family history of early CAD, Age at diagnosis: 41-50

## 2022-08-03 NOTE — ED CDU PROVIDER INITIAL DAY NOTE - GASTROINTESTINAL, MLM
Patient calling and informed patient of below information. Patient stated understanding and had no questions or concerns at this time.    Abdomen soft, non-tender, no rebound, no guarding.

## 2022-08-03 NOTE — ED PROVIDER NOTE - CLINICAL SUMMARY MEDICAL DECISION MAKING FREE TEXT BOX
pt with substernal chest pain intermittent   no associated signs or symptoms   scheduled for stress next week    negative stress and echo last year   discussed with cardiology recommend serial trop/ echo  will evaluation in am

## 2022-08-04 VITALS
HEART RATE: 62 BPM | TEMPERATURE: 98 F | DIASTOLIC BLOOD PRESSURE: 79 MMHG | SYSTOLIC BLOOD PRESSURE: 125 MMHG | OXYGEN SATURATION: 100 % | RESPIRATION RATE: 18 BRPM

## 2022-08-04 DIAGNOSIS — R07.9 CHEST PAIN, UNSPECIFIED: ICD-10-CM

## 2022-08-04 LAB — TROPONIN T SERPL-MCNC: <0.01 NG/ML — SIGNIFICANT CHANGE UP (ref 0–0.06)

## 2022-08-04 PROCEDURE — 84484 ASSAY OF TROPONIN QUANT: CPT

## 2022-08-04 PROCEDURE — 71045 X-RAY EXAM CHEST 1 VIEW: CPT

## 2022-08-04 PROCEDURE — 36415 COLL VENOUS BLD VENIPUNCTURE: CPT

## 2022-08-04 PROCEDURE — 99285 EMERGENCY DEPT VISIT HI MDM: CPT

## 2022-08-04 PROCEDURE — 85025 COMPLETE CBC W/AUTO DIFF WBC: CPT

## 2022-08-04 PROCEDURE — 99222 1ST HOSP IP/OBS MODERATE 55: CPT

## 2022-08-04 PROCEDURE — G0378: CPT

## 2022-08-04 PROCEDURE — 75574 CT ANGIO HRT W/3D IMAGE: CPT | Mod: 26,MA

## 2022-08-04 PROCEDURE — 93010 ELECTROCARDIOGRAM REPORT: CPT | Mod: 76

## 2022-08-04 PROCEDURE — 93306 TTE W/DOPPLER COMPLETE: CPT

## 2022-08-04 PROCEDURE — 99217: CPT

## 2022-08-04 PROCEDURE — 93005 ELECTROCARDIOGRAM TRACING: CPT

## 2022-08-04 PROCEDURE — 75574 CT ANGIO HRT W/3D IMAGE: CPT | Mod: MA

## 2022-08-04 PROCEDURE — 80053 COMPREHEN METABOLIC PANEL: CPT

## 2022-08-04 PROCEDURE — 96360 HYDRATION IV INFUSION INIT: CPT | Mod: XU

## 2022-08-04 PROCEDURE — 99285 EMERGENCY DEPT VISIT HI MDM: CPT | Mod: 25

## 2022-08-04 RX ORDER — ATORVASTATIN CALCIUM 80 MG/1
1 TABLET, FILM COATED ORAL
Qty: 14 | Refills: 0
Start: 2022-08-04 | End: 2022-08-17

## 2022-08-04 RX ORDER — CELECOXIB 200 MG/1
100 CAPSULE ORAL ONCE
Refills: 0 | Status: COMPLETED | OUTPATIENT
Start: 2022-08-04 | End: 2022-08-04

## 2022-08-04 RX ORDER — SODIUM CHLORIDE 9 MG/ML
1000 INJECTION INTRAMUSCULAR; INTRAVENOUS; SUBCUTANEOUS ONCE
Refills: 0 | Status: COMPLETED | OUTPATIENT
Start: 2022-08-04 | End: 2022-08-04

## 2022-08-04 RX ADMIN — SODIUM CHLORIDE 1000 MILLILITER(S): 9 INJECTION INTRAMUSCULAR; INTRAVENOUS; SUBCUTANEOUS at 04:30

## 2022-08-04 RX ADMIN — Medication 100 MILLIGRAM(S): at 08:59

## 2022-08-04 RX ADMIN — CELECOXIB 100 MILLIGRAM(S): 200 CAPSULE ORAL at 17:51

## 2022-08-04 NOTE — ED CDU PROVIDER SUBSEQUENT DAY NOTE - NSICDXFAMILYHX_GEN_ALL_CORE_FT
FAMILY HISTORY:  Father  Still living? No  Family history of early CAD, Age at diagnosis: 41-50    Sibling  Still living? No  Family history of early CAD, Age at diagnosis: 41-50

## 2022-08-04 NOTE — ED ADULT NURSE REASSESSMENT NOTE - NS ED NURSE REASSESS COMMENT FT1
pt on cardiac monitor in NSR, pt resting comfortably showing no signs of respiratory distress or pain, pt is calm and cooperative
Assumed care of pt at 19:15 as stated in report from MARIA T Berry. Charting as noted. Patient A&O x4, denies pain/discomfort, denies CP/SOB. Updated on the plan of care. Call bell within reach, bed locked in lowest position. IV site flushed w/ NS. No redness, swelling or pain noted to site. No signs of acute distress noted, safety maintained. Pt remains on CM in NSR.
pt on cardiac in NSR, pt resting comfortably showing no signs of respiratory distress or pain, pt is calm and cooperative
pt on cardiac monitor in NSR, pt resting comfortably showing no signs of respiratory distress or pain, pt is calm and cooperative
patient received at 0700, awake, alert and orientated x4, oob with minimal assistance needed.  currently npo for cardiac procedure. echo pending as well. denies any chest pain or discomfort. on cardiac monitroing NSR RATE 63, will continue to monitor

## 2022-08-04 NOTE — ED CDU PROVIDER DISPOSITION NOTE - CLINICAL COURSE
PT with SPMHX of HLD presents to the ED with complaint of CP x2wk. Pt states that she has had interment substernal chest pain that she describes as pressure, that has been not made better or worse by anything. Pt states that symptoms have become more frequent in the last 4 dyas and more sever. Pt states that pain has been sever and interment radiates to her jaw. Pt dines fever, chill, SOB, diff breathing, HA, dizziness, cough, back pain, N/V.    Underwent TTE and cardiac cta during observation. Both WNL. Spoke to Cardiologist, Dr. Marshall, who recommends outpatient followup for patient, dc on 80mg Lipitor a day. Plan discussed with patient, patient comfortable with the plan.

## 2022-08-04 NOTE — CONSULT NOTE ADULT - PROBLEM SELECTOR RECOMMENDATION 9
.  - intermittent chest pain, especially when bending down, for several weeks  - Troponin negative x3  - EKG NSR no ischemic changes  - CXR without acute pathology  - TTE with lvef 65%, G1DD, mild MR, no RWMA  - CCTA Sept 2021 without significant CAD, [facility] 2017 with normal cors  - plan for repeat CCTA  - patient is requesting [facility] for further evaluation, no indication at this time, will discuss with interventionalist  - if patient CCTA without significant abnormality, patient may d/c home and f/u with primary cardiologist for scheduled stress test

## 2022-08-04 NOTE — CONSULT NOTE ADULT - NS ATTEND AMEND GEN_ALL_CORE FT
Chest pain:  Troponin negative x3.  EKG NSR no ischemic changes.  CXR without acute pathology.  TTE with lvef 65%, G1DD, mild MR, no RWMA  CCTA Sept 2021 without significant CAD, [facility] 2017 with normal cors.  CCTA order was already placed.   patient is requesting [facility] for further evaluation, no indication at this time, will discuss with interventionalist  if patient CCTA without significant abnormality, patient may d/c home and f/u with primary cardiologist for scheduled stress test.

## 2022-08-04 NOTE — ED CDU PROVIDER DISPOSITION NOTE - NSFOLLOWUPINSTRUCTIONS_ED_ALL_ED_FT
Chest Pain    WHAT YOU NEED TO KNOW:    Chest pain can be caused by a range of conditions, from not serious to life-threatening. Chest pain can be a symptom of a digestive problem, such as acid reflux or a stomach ulcer. An anxiety attack or a strong emotion, such as anger, can also cause chest pain. Infection, inflammation, or a fracture in the bones or cartilage in your chest can cause pain or discomfort. Sometimes chest pain or pressure is caused by poor blood flow to your heart (angina). Chest pain may also be caused by life-threatening conditions such as a heart attack or blood clot in your lungs.    DISCHARGE INSTRUCTIONS:    Call your local emergency number (911 in the [place]) or have someone call if:   •You have any of the following signs of a heart attack: ?Squeezing, pressure, or pain in your chest      ?You may also have any of the following: ?Discomfort or pain in your back, neck, jaw, stomach, or arm      ?Shortness of breath      ?Nausea or vomiting      ?Lightheadedness or a sudden cold sweat            Return to the emergency department if:   •You have chest discomfort that gets worse, even with medicine.      •You cough or vomit blood.      •Your bowel movements are black or bloody.      •You cannot stop vomiting, or it hurts to swallow.      Call your doctor if:   •You have questions or concerns about your condition or care.          Medicines:   •Medicines may be given to treat the cause of your chest pain. Examples include pain medicine, anxiety medicine, or medicines to increase blood flow to your heart.      •Do not take certain medicines without asking your healthcare provider first. These include NSAIDs, herbal or vitamin supplements, and hormones, such as estrogen or progestin.      •Take your medicine as directed. Contact your healthcare provider if you think your medicine is not helping or if you have side effects. Tell him or her if you are allergic to any medicine. Keep a list of the medicines, vitamins, and herbs you take. Include the amounts, and when and why you take them. Bring the list or the pill bottles to follow-up visits. Carry your medicine list with you in case of an emergency.      Healthy living tips: If the cause of your chest pain is known, your healthcare provider will give you specific guidelines to follow. The following are general healthy guidelines:  •Do not smoke. Nicotine and other chemicals in cigarettes and cigars can cause lung and heart damage. Ask your healthcare provider for information if you currently smoke and need help to quit. E-cigarettes or smokeless tobacco still contain nicotine. Talk to your healthcare provider before you use these products.      •Choose a variety of healthy foods as often as possible. Include fresh, frozen, or canned fruits and vegetables. Also include low-fat dairy products, fish, chicken (without skin), and lean meats. Your healthcare provider or a dietitian can help you create meal plans. You may need to avoid certain foods or drinks if your pain is caused by a digestion problem.  Healthy Foods           •Lower your sodium (salt) intake. Limit foods that are high in sodium, such as canned foods, salty snacks, and cold cuts. If you add salt when you cook food, do not add more at the table. Choose low-sodium canned foods as much as possible.             •Drink plenty of water every day. Water helps your body to control your temperature and blood pressure. Ask your healthcare provider how much water you should drink every day.      •Ask about activity. Your healthcare provider will tell you which activities to limit or avoid. Ask when you can drive, return to work, and have sex. Ask about the best exercise plan for you.      •Maintain a healthy weight. Ask your healthcare provider what a healthy weight is for you. Ask him or her to help you create a safe weight loss plan if you are overweight.      •Ask about vaccines you may need. Your healthcare provider can tell you which vaccines you need, and when to get them. The following vaccines help prevent diseases that can become serious for a person with a heart condition:?The influenza (flu) vaccine is given each year. Get a flu vaccine as soon as recommended, usually in September or October.      ?The pneumonia vaccine is usually given every 5 years. Your healthcare provider may recommend the pneumonia vaccine if you are 65 or older.      ?COVID-19 vaccines are given to adults as a shot in 1 or 2 doses. Vaccination is recommended for all adults. A booster (additional) dose is also recommended for all adults. A second booster is recommended for all adults 50 or older and for immunocompromised adults 18 or older. The second booster is also recommended for adults who received the 1-dose vaccine for the first and booster doses. Your healthcare provider can tell you when to get one or both boosters.      Prevent Heart Disease          Follow up with your doctor within 72 hours, or as directed: You may need to return for more tests to find the cause of your chest pain. You may be referred to a specialist, such as a cardiologist or gastroenterologist. Write down your questions so you remember to ask them during your visits.

## 2022-08-04 NOTE — CONSULT NOTE ADULT - ASSESSMENT
57 y/o F with PMH Asperger's, anxiety, Sjogren-Syed syndrome, hernia repair also with FH significant for CAD presents with c/o of intermittent chest pain when bending down, sometimes with exertion, with radiation to jaw lasting a few weeks, without other symptoms. Patient used to follow with Dr. Soto, now follow with Dr. Landry who scheduled her for upcoming NST. Cath 2017 with normal coronaries and CCTA in Sept 2021 with small wall calcifications but no evidence of significant CAD. Trop negative x x3 and TTE without abnormality. Denies back pain, headache, dizziness, SOB, ESTRELLA, diaphoresis, syncope or N/V.
56F with hx of depression, HLD and premature CAD in first degree relatives, comes to the ED c/o 2 weeks of progressive chest pain, precordial. pressure like, non radiated, non exertional, last for about 10 mins and improves with rest. Patient has been seen by cardiologist in the recent past for similar complains, patient was supposed to undergo eval for chest pain.     Possible cardiac chest pain in a low to intermediate risk patient   unremarkable EKG and CE.   Echocardiogram with impair relaxation of the LV      plan   Coronary CT for assessment of obstructive CAD  further recs based on results.

## 2022-08-04 NOTE — ED CDU PROVIDER DISPOSITION NOTE - PATIENT PORTAL LINK FT
You can access the FollowMyHealth Patient Portal offered by [facility] by registering at the following website: [email or web address]. By joining [employer]’s FollowMyHealth portal, you will also be able to view your health information using other applications (apps) compatible with our system.

## 2022-08-04 NOTE — ED CDU PROVIDER DISPOSITION NOTE - ATTENDING CONTRIBUTION TO CARE
PMHX of HLD presents to the ED with complaint of CP x2wk. Placed in obs for TTE and cardiac cta both unremarkable.Spoke to Cardiologist, Dr. Marshall, who recommends outpatient followup for patient, dc on 80mg Lipitor a day.

## 2022-08-04 NOTE — ED CDU PROVIDER DISPOSITION NOTE - CARE PROVIDER_API CALL
Jose Landry)  Cardiovascular Disease; Internal Medicine  [address], [address]  [place] [address]  Phone: [phone]  Fax: [phone]  Follow Up Time: 4-6 Days

## 2022-08-04 NOTE — ED CDU PROVIDER SUBSEQUENT DAY NOTE - ATTENDING APP SHARED VISIT CONTRIBUTION OF CARE
I agree with the PA's note and was available for any issues/concerns. I was directly involved in patient care. My brief overall assessment is as follows:    no acute events overnight; awaiting cardiology assessment

## 2022-08-04 NOTE — CONSULT NOTE ADULT - SUBJECTIVE AND OBJECTIVE BOX
CHIEF COMPLAINT: chest pain     HPI: 56F with hx of depression, HLD and premature CAD in first degree relatives, comes to the ED c/o 2 weeks of progressive chest pain, precordial. pressure like, non radiated, non exertional, last for about 10 mins and improves with rest. Patient has been seen by cardiologist in the recent past for similar complains, patient was supposed to undergo eval for chest pain.     PAST MEDICAL & SURGICAL HISTORY:  CVA (cerebral infarction)      Heart abnormality  hole      Asperger&#x27;s disorder      Anxiety      Sjogren-Syed syndrome      Surgery, other elective  hysterectomy      S/P hysterectomy      History of hernia repair  Sept 2017,  umbilical,  had exp.  lap as well          Allergies    allergic to bee stings (Angioedema)  Environmental (Rhinitis)  No Known Drug Allergies    Intolerances        MEDICATIONS  (STANDING):  atorvastatin 20 milliGRAM(s) Oral at bedtime  celecoxib 100 milliGRAM(s) Oral once  traZODone 100 milliGRAM(s) Oral at bedtime    MEDICATIONS  (PRN):      FAMILY HISTORY:  Family history of early CAD (Father, Sibling)      +family history of premature coronary artery disease or sudden cardiac death    SOCIAL HISTORY:  Smoking-  Alcohol-  Illicit Drug use-    REVIEW OF SYSTEMS:  Constitutional: [ ] fever, [ ]weight loss,  [ ]fatigue  Eyes: [ ] visual changes  Respiratory: [ ]shortness of breath;  [ ] cough, [ ]wheezing, [ ]chills, [ ]hemoptysis  Cardiovascular: [x ] chest pain, [ ]palpitations, [ ]dizziness,  [ ]leg swelling [ ]syncope  Gastrointestinal: [ ] abdominal pain, [ ]nausea, [ ]vomiting,  [ ]diarrhea   Genitourinary: [ ] dysuria, [ ] hematuria  Neurologic: [ ] headaches [ ] tremors  [ ] weakness [ ] lightheadedness  Skin: [ ] itching, [ ]burning, [ ] rashes  Endocrine: [ ] heat or cold intolerance  Musculoskeletal: [ ] joint pain or swelling; [ ] muscle, back, or extremity pain  Psychiatric: [ ] depression, [ ]anxiety, [ ]mood swings, or [ ]difficulty sleeping  Hematologic: [ ] easy bruising, [ ] bleeding gums     [ x] All others negative	  [ ] Unable to obtain    Vital Signs Last 24 Hrs  T(C): 36.9 (04 Aug 2022 11:29), Max: 37 (03 Aug 2022 17:43)  T(F): 98.5 (04 Aug 2022 11:29), Max: 98.6 (03 Aug 2022 17:43)  HR: 58 (04 Aug 2022 11:29) (58 - 79)  BP: 111/67 (04 Aug 2022 11:29) (93/61 - 131/81)  BP(mean): 89 (03 Aug 2022 15:56) (89 - 89)  RR: 16 (04 Aug 2022 11:29) (12 - 21)  SpO2: 99% (04 Aug 2022 11:29) (97% - 100%)    Parameters below as of 04 Aug 2022 11:29  Patient On (Oxygen Delivery Method): room air      I&O's Summary      PHYSICAL EXAM:  General: No acute distress  HEENT: EOMI  Neck:  No JVD  Lungs: Clear to auscultation bilaterally; No rales or wheezing  Heart: Regular rate and rhythm; No murmurs, rubs, or gallops  Abdomen: soft, non tender, non distended   Extremities: warm, no edema   Nervous system:  Alert & Oriented X3  Psychiatric: Normal affect  Skin: No rashes or lesions    LABS:  08-03    142  |  109<H>  |  25.5<H>  ----------------------------<  97  4.0   |  22.0  |  0.98    Ca    9.6      03 Aug 2022 16:00    TPro  7.7  /  Alb  4.7  /  TBili  <0.2<L>  /  DBili  x   /  AST  22  /  ALT  28  /  AlkPhos  61  08-03    Creatinine Trend: 0.98<--                        13.8   7.66  )-----------( 266      ( 03 Aug 2022 16:00 )             42.5         Lipid Panel:   Cardiac Enzymes: CARDIAC MARKERS ( 04 Aug 2022 00:34 )  x     / <0.01 ng/mL / x     / x     / x      CARDIAC MARKERS ( 03 Aug 2022 22:54 )  x     / <0.01 ng/mL / x     / x     / x      CARDIAC MARKERS ( 03 Aug 2022 16:00 )  x     / <0.01 ng/mL / x     / x     / x                RADIOLOGY:    ECG; NSR    TELEMETRY: NSR    ECHO:   8/4/22  Summary:   1. LV Ejection Fraction by Will's Method with a biplane EF of 65 %.   2. Spectral Doppler shows impaired relaxation pattern of left   ventricular myocardial filling (Grade I diastolic dysfunction).   3. Normal left atrial size.   4. Normal right atrial size.   5. There is no evidence of pericardial effusion.   6. Mild mitral valve regurgitation.   7. Adequate TR velocity was not obtained to accurately assess RVSP.    STRESS TEST:    CATHETERIZATION:
                                             [facility]                                              CARDIOLOGY AT [address], [place] [address]                                             Telephone: [phone]. Fax:[phone]                                                       CARDIOLOGY CONSULTATION NOTE                                                                                             History obtained by: Patient and medical record  Community Cardiologist: Dr. Landry   obtained: Yes [  ] No [ x ]  Reason for Consultation: Chest Pain  Available out pt records reviewed: Yes [ x ] No [  ]    Chief complaint:    Patient is a 56y old  Female who presents with a chief complaint of     HPI: 57 y/o F with PMH Asperger's, anxiety, Sjogren-Syed syndrome, hernia repair also with FH significant for CAD presents with c/o of intermittent chest pain when bending down, sometimes with exertion, with radiation to jaw lasting a few weeks, without other symptoms. Patient used to follow with Dr. Soto, now follow with Dr. Landry who scheduled her for upcoming NST. Cath 2017 with normal coronaries and CCTA in Sept 2021 with small wall calcifications but no evidence of significant CAD. Trop negative x x3 and TTE without abnormality. Denies back pain, headache, dizziness, SOB, ESTRELLA, diaphoresis, syncope or N/V.        CARDIAC TESTING   ECHO:  < from: TTE Echo Complete w/o Contrast w/ Doppler (08.03.22 @ 21:09) >  PHYSICIAN INTERPRETATION:  Left Ventricle: The left ventricular internal cavity size is normal. Left  ventricular wall thickness is normal.  Normal segmental left ventricular systolic function. Spectral Doppler   shows impaired relaxation pattern of left ventricular myocardial filling   (Grade I diastolic dysfunction). Normal LV filling pressures.  Right Ventricle: The right ventricular size is normal. RV systolic   function is normal. Prominent moderator band.  Left Atrium: Normal left atrial size.  Right Atrium: Normal right atrial size.  Pericardium: There is no evidence of pericardial effusion.  Mitral Valve: The mitral valve is normal in structure. Mitral leaflet   mobility is normal. Mild mitral valve regurgitation is seen.  Tricuspid Valve: The tricuspid valve is normal in structure. Trivial   tricuspid regurgitation is visualized. Adequate TR velocity was not   obtained to accurately assess RVSP.  Aortic Valve: The aortic valve is trileaflet. No evidence of aortic   stenosis. Trivial aortic valve regurgitation is seen.  Pulmonic Valve: The pulmonic valve is normal. Trace pulmonic valve   regurgitation.  Aorta: The aortic root is normal in size and structure.  Pulmonary Artery: The main pulmonary artery is normal in size.  Venous: The right upper pulmonary vein is normal. The inferior vena cava   was normal sized, with respiratory size variation greater than 50%.      Summary:   1. LV Ejection Fraction by Will's Method with a biplane EF of 65 %.   2. Spectral Doppler shows impaired relaxation pattern of left   ventricular myocardial filling (Grade I diastolic dysfunction).   3. Normal left atrial size.   4. Normal right atrial size.   5. There is no evidence of pericardial effusion.   6. Mild mitral valve regurgitation.   7. Adequate TR velocity was not obtained to accurately assess RVSP.    Caleb Marshall MD Electronically signed on 8/4/2022 at 7:49:39 AM    < end of copied text >    STRESS:    < from: Cardiac Cath Lab - Adult (09.29.17 @ 12:59) >  VENTRICLES: LVEF 55%  CORONARY VESSELS: The coronary circulation is right dominant.  LM:   --  LM: Normal.  LAD:   --  LAD: Normal.  CX:   --  Circumflex: Normal.  RCA:   --  RCA: Normal.  COMPLICATIONS: There were no complications.  DIAGNOSTIC IMPRESSIONS: The coronary anatomy is normal.  DIAGNOSTIC RECOMMENDATIONS: The patient should continue with the present  medications.    < end of copied text >  ELECTROPHYSIOLOGY:     PAST MEDICAL HISTORY  CVA (cerebral infarction)  Heart abnormality  Asperger&#x27;s disorder  Anxiety  Sjogren-Syed syndrome        PAST SURGICAL HISTORY  Surgery, other elective  S/P hysterectomy  History of hernia repair        SOCIAL HISTORY:  Denies smoking/alcohol/drugs  CIGARETTES:     ALCOHOL:  DRUGS:    FAMILY HISTORY:  Family history of early CAD (Father, Sibling)      Family History of Cardiovascular Disease:  Yes [ x] No [  ]  Coronary Artery Disease in first degree relative: Yes [x  ] No [  ]  Sudden Cardiac Death in First degree relative: Yes [  ] No [ x ]    HOME MEDICATIONS:  aspirin 81 mg oral delayed release capsule:  orally  (29 Sep 2017 09:38)  calcium carbonate 1200 mg/ vit d- min: 1 tab(s) orally once a day (29 Sep 2017 09:38)  Crestor 10 mg oral tablet: 1 tab(s) orally once a day (at bedtime) (29 Sep 2017 09:38)  Fish Oil 1000 mg oral capsule: 1 cap(s) orally once a day (29 Sep 2017 09:38)  hyoscyamine 0.125 mg sublingual tablet: 1 tab(s) sublingual every 4 hours, As Needed - for indigestion, for heartburn (29 Sep 2017 09:38)  Multiple Vitamins with Minerals oral capsule: 1 cap(s) orally once a day (29 Sep 2017 09:38)  rosuvastatin 10 mg oral tablet: 1 tab(s) orally once a day (at bedtime) (29 Sep 2017 09:38)  Topamax 200 mg oral tablet:  orally once a day (at bedtime) (29 Sep 2017 09:38)  traZODone 50 mg oral tablet: 1 tab(s) orally once a day (at bedtime) (29 Sep 2017 09:38)  Vyvanse 50 mg oral capsule: 1 cap(s) orally once a day (in the morning) (29 Sep 2017 09:38)      CURRENT CARDIAC MEDICATIONS:      CURRENT OTHER MEDICATIONS:  celecoxib 100 milliGRAM(s) Oral once, Stop order after: 1 Doses  traZODone 100 milliGRAM(s) Oral at bedtime  atorvastatin 20 milliGRAM(s) Oral at bedtime      ALLERGIES:   allergic to bee stings (Angioedema)  Environmental (Rhinitis)  No Known Drug Allergies      REVIEW OF SYMPTOMS:   CONSTITUTIONAL: No fever, no chills, no weight loss, no weight gain, no fatigue   ENMT:  No vertigo; No sinus or throat pain  NECK: No pain or stiffness  CARDIOVASCULAR: +chest pain, no dyspnea, no syncope/presyncope, no palpitations, no dizziness, no Orthopnea, no Paroxsymal nocturnal dyspnea  RESPIRATORY: no Shortness of breath, no cough, no wheezing  : No dysuria, no hematuria   GI: No dark color stool, no nausea, no diarrhea, no constipation, no abdominal pain   NEURO: No headache, no slurred speech   MUSCULOSKELETAL: No joint pain or swelling; No muscle, back, or extremity pain  PSYCH: No agitation, no anxiety.    ALL OTHER REVIEW OF SYSTEMS ARE NEGATIVE.    VITAL SIGNS:  T(C): 36.8 (08-04-22 @ 07:56), Max: 37.2 (08-03-22 @ 13:47)  T(F): 98.3 (08-04-22 @ 07:56), Max: 99 (08-03-22 @ 13:47)  HR: 66 (08-04-22 @ 07:56) (62 - 90)  BP: 117/78 (08-04-22 @ 07:56) (93/61 - 131/81)  RR: 19 (08-04-22 @ 07:56) (12 - 22)  SpO2: 99% (08-04-22 @ 07:56) (96% - 100%)    INTAKE AND OUTPUT:       PHYSICAL EXAM:  Constitutional: Comfortable . No acute distress.   HEENT: Atraumatic and normocephalic , neck is supple . no JVD. No carotid bruit.  CNS: A&Ox3. No focal deficits.   Respiratory: CTAB, unlabored   Cardiovascular: RRR normal s1 s2. No murmur. No rubs or gallop.  Gastrointestinal: Soft, non-tender. +Bowel sounds.   Extremities: 2+ Peripheral Pulses, No clubbing, cyanosis, or edema  Psychiatric: Calm . no agitation.   Skin: Warm and dry, no ulcers on extremities     LABS:  ( 04 Aug 2022 00:34 )  Troponin T  <0.01,  CPK  X    , CKMB  X    , BNP X        , ( 03 Aug 2022 22:54 )  Troponin T  <0.01,  CPK  X    , CKMB  X    , BNP X        , ( 03 Aug 2022 16:00 )  Troponin T  <0.01,  CPK  X    , CKMB  X    , BNP X                                  13.8   7.66  )-----------( 266      ( 03 Aug 2022 16:00 )             42.5     08-03    142  |  109<H>  |  25.5<H>  ----------------------------<  97  4.0   |  22.0  |  0.98    Ca    9.6      03 Aug 2022 16:00    TPro  7.7  /  Alb  4.7  /  TBili  <0.2<L>  /  DBili  x   /  AST  22  /  ALT  28  /  AlkPhos  61  08-03                INTERPRETATION OF TELEMETRY:     ECG: NSR  Prior ECG: Yes [  ] No [  ]    RADIOLOGY & ADDITIONAL STUDIES:    X-ray:    CT scan:   < from: CT Angio Cardiac w/ IV Cont (09.10.21 @ 12:00) >    FINDINGS:    VISUALIZED LUNGS: Clear  MEDIASTINUM:  no significant mediastinal adenopathy.  BONES:  Mild degenerative changes  AORTA: No thoracic aortic dissection is noted in the visualized thoracic aorta.  PULMONARY ARTERIES: No pulmonary embolus is noted within the visualized central pulmonary arteries.  PERICARDIUM/CARDIAC STRUCTURES:  normal    CORONARY:  -DOMINANCE: right  -LEFT MAIN CORONARY ARTERY: Patent  -ANTERIOR DESCENDING ARTERY:       -PROX: Small wall calcifications without narrowing       -MID: Small wall calcifications without narrowing       -DISTAL:  Patent  -CIRCUMFLEX ARTERY:       -PROX:  Patent       -MID: Patent, terminates in an obtuse marginal branch.  -RIGHT CORONARY ARTERY:       -PROX:  Patent       -MID:  Patent       -DISTAL:  Patent      Myocardial Function:  The left ventricle is of normal size, wall thickness and function. Cine display demonstrates no regional wall motion abnormality. LVEDV= 120 cc; LVESV= 51 cc. Calculated ejection fraction is 57%.    CALCIUM SCORE  Agatston Equivalent Score    Segment                                Score  --------------------------------------------------  Left Main                                0  Left anterior Descending          9  Circumflex                              0  Right                                      0  --------------------------------------------------  Total                                      9    Age/Sex Adjusted Percentile Rating (Raggi, Circulation 2000):   Between 50th and 75th percentile, cardiac age of 60-64 years    IMPRESSION:    No evidence of hemodynamically significant coronary artery disease.    --- End of Report ---    < end of copied text >  MRI:   US:

## 2022-08-31 ENCOUNTER — APPOINTMENT (OUTPATIENT)
Dept: ORTHOPEDIC SURGERY | Facility: CLINIC | Age: 57
End: 2022-08-31

## 2022-08-31 VITALS
HEIGHT: 63 IN | BODY MASS INDEX: 23.04 KG/M2 | HEART RATE: 81 BPM | DIASTOLIC BLOOD PRESSURE: 82 MMHG | SYSTOLIC BLOOD PRESSURE: 117 MMHG | WEIGHT: 130 LBS

## 2022-08-31 PROCEDURE — 99213 OFFICE O/P EST LOW 20 MIN: CPT

## 2022-08-31 NOTE — HISTORY OF PRESENT ILLNESS
[de-identified] : Patient is a 56-year-old female here today for follow-up of her left shoulder pain and adhesive capsulitis.  She has been in physical therapy and taking Celebrex since her last visit.  She is not making any progress with her physical therapy.  States she still has severe pain and limitations in her range of motion.  States that she is becoming frustrated due to the lack of progress.  Denies any history of trauma.  Denies any constitutional symptoms

## 2022-08-31 NOTE — PHYSICAL EXAM
[de-identified] : Left upper extremity: Active range of motion limited to 90 degrees with pain passive range of motion to 100 external rotation to 30 internal rotation to 20, mildly positive cross chest, positive empty can, positive Dickson's, negative belly press, AIN PIN median radial ulnar nerves intact, fingers warm well-perfused with cap refill, no palpable masses

## 2022-08-31 NOTE — DISCUSSION/SUMMARY
[Medication Risks Reviewed] : Medication risks reviewed [Surgical risks reviewed] : Surgical risks reviewed [de-identified] : Patient is a 56-year-old female with adhesive capsulitis of the left shoulder here today for follow-up.  She is not making any progress in her left shoulder and is becoming quite stiff.  She has tried extensive conservative treatment including physical therapy injections and medications.  Due to the fact that she is failing to have any response to conservative treatment I have given her referral to one of my shoulder colleagues for further evaluation and possible surgical intervention for her left shoulder adhesive capsulitis.  I would like her to continue with physical therapy and have given her a repeat prescription for that.  She is continue take the NSAIDs as needed for the pain.  I will see her back on an as-needed basis for her left shoulder

## 2022-09-01 ENCOUNTER — TRANSCRIPTION ENCOUNTER (OUTPATIENT)
Age: 57
End: 2022-09-01

## 2022-09-15 ENCOUNTER — APPOINTMENT (OUTPATIENT)
Dept: ORTHOPEDIC SURGERY | Facility: CLINIC | Age: 57
End: 2022-09-15

## 2022-09-15 VITALS
HEART RATE: 85 BPM | HEIGHT: 63 IN | SYSTOLIC BLOOD PRESSURE: 104 MMHG | WEIGHT: 130 LBS | DIASTOLIC BLOOD PRESSURE: 73 MMHG | BODY MASS INDEX: 23.04 KG/M2

## 2022-09-15 PROCEDURE — 99213 OFFICE O/P EST LOW 20 MIN: CPT

## 2022-09-15 RX ORDER — MELOXICAM 15 MG/1
15 TABLET ORAL
Qty: 21 | Refills: 0 | Status: ACTIVE | COMMUNITY
Start: 2022-09-15 | End: 1900-01-01

## 2022-09-15 NOTE — PHYSICAL EXAM
[de-identified] : General:\par Awake, alert, no acute distress, Patient was cooperative and appropriate during the examination.\par \par The patient is of normal weight for height and age.\par \par Walks without an antalgic gait.\par \par Full, painless range of motion of the neck and back.\par \par Exam of the bilateral lower extremities is intact and symmetric with regards to dermatologic, vascular, and neurologic exam. Bilateral lower extremity sensation is grossly intact to light touch in the DP/SP/T/S/S nerve distributions. Intact DF/PF/EHL. BIlateral lower extremities warm and well-perfused with brisk capillary refill.\par \par \par Pulmonary:\par Regular, nonlabored breathing\par \par Abdomen:\par Soft, nontender, nondistended.\par \par Lymphatic:\par No evidence of axillary lymphadenopathy\par \par Left shoulder Exam:\par Physical exam of the shoulder demonstrates normal skin without signs of skin changes or abnormalities. No erythema, warmth, or joint effusion appreciated.  \par  \par Sensation intact light touch C5-T1\par Palpable radial pulse\par Radial/ulnar/median/axillary/musculocutaneous/AIN/PIN nerves grossly intact\par  \par Range of motion:\par Forward Flexion: 100 actively and passively\par Abduction: 80 actively and passively\par External Rotation: 20 actively and passively\par Internal Rotation: Left flank actively and passively\par  \par Palpation:\par Not tender to palpation over the glenohumeral joint\par Mildly tender palpation over the rotator cuff insertion on the greater tuberosity\par Not tender to palpation over the AC joint\par Not tender to palpation of the biceps tendon/bicipital groove\par  \par Strength testing:\par Supraspinatus: 5/5\par Infraspinatus: 5/5\par Subscapularis: 5/5\par  \par Special test:\par Empty can test positive\par Martinez impingement test positive\par Speeds test negative\par Birchwood's test negative\par Lift-off test negative\par Bell-press test negative\par Cross-arm adduction test negative\par  \par   [de-identified] : MRI of the left shoulder taken at  radiology  on June 20, 2021 showed evidence of adhesive capsulitis and mild tendinopathy of the rotator cuff with no other acute findings.

## 2022-09-15 NOTE — DISCUSSION/SUMMARY
[de-identified] : Assessment: 56-year-old female with left shoulder adhesive capsulitis.\par \par Plan: I had a long discussion with the patient today regarding the nature of their diagnosis and treatment plan. We discussed the risks and benefits of no treatment as well as nonoperative and operative treatments.  I reviewed the MRI results with her today that revealed evidence of adhesive capsulitis and no other acute findings.  At this time I recommend continued conservative treatment of the patient's condition with modalities including rest, ice, heat, anti-inflammatory medications, activity modifications, and home stretching and strengthening exercises daily.  A prescription for meloxicam was sent to her pharmacy today.  She is advised to discontinue Celebrex.  I discussed with the patient the risks and benefits associated with NSAID use.  A new referral for physical therapy was provided today to continue working on stretching exercises for the shoulder.  She was also advised with referral for an ultrasound-guided left shoulder glenohumeral joint cortisone injection help improve her pain and function.  Recommend follow-up in 8 weeks for repeat evaluation.  If the patient fails conservative management we will discuss surgical arthroscopy and manipulation.\par  \par The patient verbalizes their understanding and agrees with the plan.  All questions were answered to their satisfaction.

## 2022-09-15 NOTE — HISTORY OF PRESENT ILLNESS
[de-identified] : 09/15/2022 : EDMOND MICHAELS  is a 56 year year old female who presents to the office for evaluation of her left shoulder.  She previously saw Dr. Tate back in April for her left shoulder pain.  He recommended anti-inflammatories and physical therapy which she did for several months however recently the physical therapist states that the insurance is no longer approving therapy.  She states she has not had formal physical therapy in the last 2 weeks because of this.  She states she is been taking Celebrex, using ice and heat and doing therapy on her own as well as with the therapist for several months and is making no improvement.  She states it is like a "groundhog stay".  She states she received an injection back on June 30 into the subacromial space by Dr. Tate which made her symptoms worse for several days and then went back to its baseline.  She is here for specialist opinion because of her continued pain and stiffness in the shoulder.  She states that radiates down the arm and is very sharp and interfering with her activities and sleep.  She denies any numbness or tingling distally.  She has no other complaints today.  She denies any acute traumatic injury.

## 2022-10-04 RX ORDER — MELOXICAM 15 MG/1
15 TABLET ORAL
Qty: 30 | Refills: 0 | Status: ACTIVE | COMMUNITY
Start: 2022-10-04 | End: 1900-01-01

## 2022-11-06 RX ORDER — MELOXICAM 15 MG/1
15 TABLET ORAL
Qty: 21 | Refills: 0 | Status: ACTIVE | COMMUNITY
Start: 2022-11-06 | End: 1900-01-01

## 2022-12-28 ENCOUNTER — OFFICE (OUTPATIENT)
Dept: URBAN - METROPOLITAN AREA CLINIC 115 | Facility: CLINIC | Age: 57
Setting detail: OPHTHALMOLOGY
End: 2022-12-28
Payer: MEDICAID

## 2022-12-28 DIAGNOSIS — H16.223: ICD-10-CM

## 2022-12-28 DIAGNOSIS — H52.4: ICD-10-CM

## 2022-12-28 DIAGNOSIS — H35.363: ICD-10-CM

## 2022-12-28 DIAGNOSIS — H25.13: ICD-10-CM

## 2022-12-28 DIAGNOSIS — H35.40: ICD-10-CM

## 2022-12-28 PROBLEM — G44.009 CLUSTER HEADACHE: Status: ACTIVE | Noted: 2022-12-28

## 2022-12-28 PROCEDURE — 92134 CPTRZ OPH DX IMG PST SGM RTA: CPT | Performed by: OPTOMETRIST

## 2022-12-28 PROCEDURE — 92015 DETERMINE REFRACTIVE STATE: CPT | Performed by: OPTOMETRIST

## 2022-12-28 PROCEDURE — 92004 COMPRE OPH EXAM NEW PT 1/>: CPT | Performed by: OPTOMETRIST

## 2022-12-28 ASSESSMENT — REFRACTION_CURRENTRX
OD_OVR_VA: 20/
OS_ADD: +2.50
OD_SPHERE: +2.75
OS_SPHERE: +0.50
OD_OVR_VA: 20/
OD_SPHERE: +0.50
OS_VPRISM_DIRECTION: SV
OD_OVR_VA: 20/
OD_VPRISM_DIRECTION: SV
OS_SPHERE: +2.75
OD_AXIS: 180
OS_OVR_VA: 20/
OS_OVR_VA: 20/
OD_ADD: +2.50
OS_OVR_VA: 20/
OD_CYLINDER: -0.50

## 2022-12-28 ASSESSMENT — REFRACTION_AUTOREFRACTION
OD_AXIS: 085
OD_SPHERE: +1.25
OD_CYLINDER: -0.50
OS_CYLINDER: -0.50
OS_AXIS: 005
OS_SPHERE: +0.75

## 2022-12-28 ASSESSMENT — SPHEQUIV_DERIVED
OS_SPHEQUIV: 0.25
OD_SPHEQUIV: 1
OS_SPHEQUIV: 0.5
OD_SPHEQUIV: 0.75

## 2022-12-28 ASSESSMENT — REFRACTION_MANIFEST
OS_VA1: 20/25
OS_ADD: +2.00
OD_AXIS: 085
OD_CYLINDER: -0.50
OS_CYLINDER: -0.50
OS_AXIS: 005
OD_VA1: 20/25
OD_ADD: +2.00
OD_SPHERE: +1.00
OS_SPHERE: +0.50

## 2022-12-28 ASSESSMENT — CONFRONTATIONAL VISUAL FIELD TEST (CVF)
OS_FINDINGS: FULL
OD_FINDINGS: FULL

## 2022-12-28 ASSESSMENT — VISUAL ACUITY
OD_BCVA: 20/30
OS_BCVA: 20/30

## 2022-12-28 ASSESSMENT — TONOMETRY
OS_IOP_MMHG: 16
OD_IOP_MMHG: 18

## 2022-12-28 ASSESSMENT — SUPERFICIAL PUNCTATE KERATITIS (SPK)
OD_SPK: 2+
OS_SPK: 2+

## 2023-02-23 ENCOUNTER — NON-APPOINTMENT (OUTPATIENT)
Age: 58
End: 2023-02-23

## 2023-05-01 ENCOUNTER — APPOINTMENT (OUTPATIENT)
Dept: ORTHOPEDIC SURGERY | Facility: CLINIC | Age: 58
End: 2023-05-01
Payer: MEDICAID

## 2023-05-01 VITALS
HEIGHT: 62 IN | WEIGHT: 135 LBS | TEMPERATURE: 98.1 F | SYSTOLIC BLOOD PRESSURE: 112 MMHG | HEART RATE: 79 BPM | BODY MASS INDEX: 24.84 KG/M2 | DIASTOLIC BLOOD PRESSURE: 80 MMHG

## 2023-05-01 PROCEDURE — 99214 OFFICE O/P EST MOD 30 MIN: CPT

## 2023-05-01 RX ORDER — METHYLPREDNISOLONE 4 MG/1
4 TABLET ORAL
Qty: 1 | Refills: 0 | Status: ACTIVE | COMMUNITY
Start: 2023-05-01 | End: 1900-01-01

## 2023-05-01 NOTE — HISTORY OF PRESENT ILLNESS
[de-identified] : 5/1/2023: Varsha is a pleasant 57-year-old female returns to the office today for new complaints of bilateral hip pain.  She also is following up for left shoulder pain and stiffness.  She has not been seen since September 2022.  She states that she did have an ultrasound-guided glenohumeral joint cortisone injection for her shoulder several months ago which was substantially helpful for her pain and inflammation.  Since that time her motion and pain in the shoulder got a lot better.  She is still going to therapy for her shoulder for residual stiffness.  Overall she is pleased with the progress.  She is more concerned that she is now having pain in both of her hips as well as her knees.  She also has had pain in her lower back previously.  She did have MRIs of both hips and moderate discuss these results today.  She denies any injury.  Her pain is activity related relieved by rest.  She takes occasional meloxicam which is mildly helpful.  She has had no therapy or injections for her lower extremities.  The patient denies any fevers, chills, sweats, recent illnesses, numbness, tingling, weakness, or pain elsewhere at this time.  Of note, the patient has a history of Sjogren's disease.\par \par 09/15/2022 : VARSHA MICHAELS  is a 56 year year old female who presents to the office for evaluation of her left shoulder.  She previously saw Dr. Bebeto elliott in April for her left shoulder pain.  He recommended anti-inflammatories and physical therapy which she did for several months however recently the physical therapist states that the insurance is no longer approving therapy.  She states she has not had formal physical therapy in the last 2 weeks because of this.  She states she is been taking Celebrex, using ice and heat and doing therapy on her own as well as with the therapist for several months and is making no improvement.  She states it is like a "groundhog stay".  She states she received an injection back on June 30 into the subacromial space by Dr. Tate which made her symptoms worse for several days and then went back to its baseline.  She is here for specialist opinion because of her continued pain and stiffness in the shoulder.  She states that radiates down the arm and is very sharp and interfering with her activities and sleep.  She denies any numbness or tingling distally.  She has no other complaints today.  She denies any acute traumatic injury.

## 2023-05-01 NOTE — REVIEW OF SYSTEMS
[Negative] : Heme/Lymph [FreeTextEntry9] : Left shoulder pain, bilateral hip pain, bilateral knee pain, low back pain

## 2023-05-01 NOTE — PHYSICAL EXAM
[de-identified] : General:\par Awake, alert, no acute distress, Patient was cooperative and appropriate during the examination.\par \par The patient is of normal weight for height and age.\par \par Walks without an antalgic gait.\par \par Full, painless range of motion of the neck and back.\par \par Exam of the bilateral lower extremities is intact and symmetric with regards to dermatologic, vascular, and neurologic exam. Bilateral lower extremity sensation is grossly intact to light touch in the DP/SP/T/S/S nerve distributions. Intact DF/PF/EHL. BIlateral lower extremities warm and well-perfused with brisk capillary refill.\par \par \par Pulmonary:\par Regular, nonlabored breathing\par \par Abdomen:\par Soft, nontender, nondistended.\par \par Lymphatic:\par No evidence of axillary lymphadenopathy\par \par Left shoulder Exam:\par Physical exam of the shoulder demonstrates normal skin without signs of skin changes or abnormalities. No erythema, warmth, or joint effusion appreciated.  \par  \par Sensation intact light touch C5-T1\par Palpable radial pulse\par Radial/ulnar/median/axillary/musculocutaneous/AIN/PIN nerves grossly intact\par  \par Range of motion:\par Forward Flexion: 150 actively and passively\par Abduction: 90 actively and passively\par External Rotation: 30 actively and passively\par Internal Rotation: L3 active and passive\par  \par Palpation:\par Not tender to palpation over the glenohumeral joint\par Not tender palpation over the rotator cuff insertion on the greater tuberosity\par Not tender to palpation over the AC joint\par Not tender to palpation of the biceps tendon/bicipital groove\par  \par Strength testing:\par Supraspinatus: 5/5\par Infraspinatus: 5/5\par Subscapularis: 5/5\par  \par Special test:\par Empty can test negative\par Martinez impingement test negative\par Speeds test negative\par Bond's test negative\par Lift-off test negative\par Bell-press test negative\par Cross-arm adduction test negative\par \par Bilateral Hip Examination:\par Physical examination of the hips demonstrates normal skin without signs of skin changes or abnormalities. No erythema, warmth, or joint effusion is appreciated.\par \par Sensation is intact to light touch L2-S1 bilaterally\par Palpable DP/PT pulses\par EHL/FHL/TA/GSC motor function intact bilaterally\par \par Range of Motion\par 110 degrees of flexion to full extension bilaterally\par 30 degrees of internal rotation bilaterally\par 60 degrees of external rotation bilaterally\par \par Strength Testing\par Hip Flexors/Hip Extensors 5/5\par Hip Abductors/Hip Adductors 5/5\par Quadriceps/Hamstring 5/5\par Patient is able to perform a straight leg raise bilaterally without difficulty.\par \par Palpation\par Not tender to palpation about the greater trochanters\par Mildly tender to palpation about the hip joints\par \par Special Tests\par TITUS test negative bilaterally\par FADIR test positive bilaterally\par Eduardo test negative bilaterally\par Straight leg raise negative bilaterally\par \par \par \par \par \par  [de-identified] : MRI of both hips from [place] radiology facility was reviewed today.  The patient has mild osteoarthritic changes with chondral delamination in both hips.  No other acute findings.

## 2023-05-01 NOTE — DISCUSSION/SUMMARY
[de-identified] : Assessment: 57-year-old female with bilateral hip pain and multiple joint pain\par \par Plan: I had a long discussion with the patient today regarding the nature of their diagnosis and treatment plan. We discussed the risks and benefits of no treatment as well as nonoperative and operative treatments.  I reviewed the patient's hip MRIs today with her in the office which do demonstrate acetabular labral tears and early arthritic changes.  On examination she has good range of motion but positive FADIR testing bilaterally consistent with a labral tears.  Of concern, she also complains of pain and issues with her left shoulder, lower back, and bilateral knees over the past several months.  She does have a history of Sjogren's disease.  At this time I recommend conservative treatment of the patient's condition with modalities including rest, ice, heat, anti-inflammatory medications, activity modifications, and home stretching and strengthening exercises daily.  A prescription for Medrol Dosepak was sent to her pharmacy today.  We reviewed the risk and benefits of this medication.  She may also take meloxicam as previously prescribed as needed.  GI precautions were discussed.  I discussed with the patient the risks and benefits associated with NSAID use.  A referral for physical therapy was provided today to begin working exercises for both lower extremities help improve her pain and function.  She was also referred to see a rheumatologist given her multiple joint aches and pains and given her medical history.  Recommend follow-up in 3 months for repeat assessment.\par  \par The patient verbalizes their understanding and agrees with the plan.  All questions were answered to their satisfaction.

## 2023-08-15 ENCOUNTER — APPOINTMENT (OUTPATIENT)
Dept: ORTHOPEDIC SURGERY | Facility: CLINIC | Age: 58
End: 2023-08-15
Payer: MEDICAID

## 2023-08-15 VITALS
HEART RATE: 74 BPM | DIASTOLIC BLOOD PRESSURE: 78 MMHG | WEIGHT: 135 LBS | HEIGHT: 62 IN | SYSTOLIC BLOOD PRESSURE: 112 MMHG | BODY MASS INDEX: 24.84 KG/M2

## 2023-08-15 PROCEDURE — 99213 OFFICE O/P EST LOW 20 MIN: CPT

## 2023-08-15 NOTE — DISCUSSION/SUMMARY
[de-identified] : Assessment: 57-year-old female with new onset right hip pain, chronic left hip pain and multiple joint pain  Plan: I had a long discussion with the patient today regarding the nature of their diagnosis and treatment plan. We discussed the risks and benefits of no treatment as well as nonoperative and operative treatments.  I reviewed the patient's hip MRIs today with her in the office which do demonstrate acetabular labral tears and early arthritic changes.  On examination she has good range of motion but positive FADIR testing bilaterally consistent with a labral tears.  She also has inability to actively flex her hip against gravity and straight leg raise against gravity because of weakness and discomfort in the hip.  At this time due to her recent acute traumatic injury and worsening function and weakness in the leg I am recommending an MRI of the right hip for further evaluation to evaluate for an occult bony injury versus acute tendon rupture given her new onset pain and weakness and dysfunction of the leg and new injury.  She will follow-up after the MRI is complete for repeat evaluation to discuss.  In the interim she will rest and avoid walking and avoid exacerbating activities and will remain out of gym and sports.  She will take over-the-counter medication as needed and will discontinue physical therapy until we get the MRI results back.  She will follow-up in the office for further discussion and further assessment at that time.  She was advised of red flag signs and will call if her clinical picture changes.   The patient verbalizes their understanding and agrees with the plan.  All questions were answered to their satisfaction.
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
## 2023-08-15 NOTE — PHYSICAL EXAM
[de-identified] : General: Awake, alert, no acute distress, Patient was cooperative and appropriate during the examination.  The patient is of normal weight for height and age.  Walks without an antalgic gait.  Full, painless range of motion of the neck and back.  Exam of the bilateral lower extremities is intact and symmetric with regards to dermatologic, vascular, and neurologic exam. Bilateral lower extremity sensation is grossly intact to light touch in the DP/SP/T/S/S nerve distributions. Intact DF/PF/EHL. BIlateral lower extremities warm and well-perfused with brisk capillary refill.   Pulmonary: Regular, nonlabored breathing  Abdomen: Soft, nontender, nondistended.  Lymphatic: No evidence of axillary lymphadenopathy  Left hip Examination: Physical examination of the hips demonstrates normal skin without signs of skin changes or abnormalities. No erythema, warmth, or joint effusion is appreciated.  Sensation is intact to light touch L2-S1 bilaterally Palpable DP/PT pulses EHL/FHL/TA/GSC motor function intact bilaterally  Range of Motion 110 degrees of flexion to full extension bilaterally 30 degrees of internal rotation bilaterally 60 degrees of external rotation bilaterally  Strength Testing Hip Flexors/Hip Extensors 5/5 Hip Abductors/Hip Adductors 5/5 Quadriceps/Hamstring 5/5 Patient is able to perform a straight leg raise bilaterally without difficulty.  Palpation Not tender to palpation about the greater trochanters Mildly tender to palpation about the hip joints  Special Tests TITUS test negative bilaterally FADIR test positive bilaterally Eduardo test negative bilaterally Straight leg raise negative bilaterally  Right hip Examination: Physical examination of the hip demonstrates normal skin without signs of skin changes or abnormalities. No erythema, warmth, or joint effusion is appreciated.   Sensation is intact to light touch L2-S1 Palpable DP/PT pulse EHL/FHL/TA/GSC motor function intact   Range of Motion 130 degrees of flexion to full extension with pain at terminal degrees of flexion 30 degrees of internal rotation 70 degrees of external rotation with pain at terminal degrees of motion 45 degrees of hip abduction 20 degrees of hip adduction   Strength Testing Hip Extensors 5/5 Hip flexors 4/5 strength Hip Abductors/Hip Adductors 5/5 Quadriceps/Hamstring 5/5 Able to extend her knee with 5 out of 5 strength Unable to perform a straight leg raise on the right side due to weakness and discomfort   Palpation Moderately tender to palpation about the greater trochanter Exquisitely tender to palpation about the hip joint Not tender to palpation about the pubic symphysis Not tender to palpation about the rectus and conjoint tendon insertions Not tender to palpation about the adductor longus tendon origin   Special Tests TITUS test positive for pain FADIR test positive for pain Hess's test negative Eduardo test negative Resisted sit-ups negative Pain with valsalva negative Straight leg raise test negative        [de-identified] : X-rays 2 views of the right hip taken in the office today on 8/15/2023 shows no acute fracture or dislocation.  MRI of both hips from [place] radiology facility was reviewed today.  The patient has mild osteoarthritic changes with chondral delamination in both hips.  No other acute findings.

## 2023-08-15 NOTE — REASON FOR VISIT
[Initial Visit] : an initial visit for [Shoulder Pain] : shoulder pain [Knee Pain] : knee pain [FreeTextEntry2] : Left shoulder pain

## 2023-08-18 ENCOUNTER — NON-APPOINTMENT (OUTPATIENT)
Age: 58
End: 2023-08-18

## 2023-08-18 ENCOUNTER — EMERGENCY (EMERGENCY)
Facility: HOSPITAL | Age: 58
LOS: 1 days | Discharge: HOME | End: 2023-08-18
Attending: EMERGENCY MEDICINE
Payer: MEDICAID

## 2023-08-18 VITALS
OXYGEN SATURATION: 99 % | TEMPERATURE: 98 F | HEART RATE: 90 BPM | HEIGHT: 63 IN | WEIGHT: 145.06 LBS | RESPIRATION RATE: 16 BRPM | DIASTOLIC BLOOD PRESSURE: 85 MMHG | SYSTOLIC BLOOD PRESSURE: 117 MMHG

## 2023-08-18 DIAGNOSIS — Z41.9 ENCOUNTER FOR PROCEDURE FOR PURPOSES OTHER THAN REMEDYING HEALTH STATE, UNSPECIFIED: Chronic | ICD-10-CM

## 2023-08-18 DIAGNOSIS — Z98.890 OTHER SPECIFIED POSTPROCEDURAL STATES: Chronic | ICD-10-CM

## 2023-08-18 DIAGNOSIS — Z90.710 ACQUIRED ABSENCE OF BOTH CERVIX AND UTERUS: Chronic | ICD-10-CM

## 2023-08-18 LAB
ALBUMIN SERPL ELPH-MCNC: 4.6 G/DL — SIGNIFICANT CHANGE UP (ref 3.3–5.2)
ALP SERPL-CCNC: 55 U/L — SIGNIFICANT CHANGE UP (ref 40–120)
ALT FLD-CCNC: 32 U/L — SIGNIFICANT CHANGE UP
ANION GAP SERPL CALC-SCNC: 14 MMOL/L — SIGNIFICANT CHANGE UP (ref 5–17)
APPEARANCE UR: ABNORMAL
AST SERPL-CCNC: 29 U/L — SIGNIFICANT CHANGE UP
BACTERIA # UR AUTO: ABNORMAL
BASOPHILS # BLD AUTO: 0.08 K/UL — SIGNIFICANT CHANGE UP (ref 0–0.2)
BASOPHILS NFR BLD AUTO: 1.3 % — SIGNIFICANT CHANGE UP (ref 0–2)
BILIRUB SERPL-MCNC: <0.2 MG/DL — LOW (ref 0.4–2)
BILIRUB UR-MCNC: NEGATIVE — SIGNIFICANT CHANGE UP
BUN SERPL-MCNC: 19.2 MG/DL — SIGNIFICANT CHANGE UP (ref 8–20)
CALCIUM SERPL-MCNC: 9.7 MG/DL — SIGNIFICANT CHANGE UP (ref 8.4–10.5)
CHLORIDE SERPL-SCNC: 106 MMOL/L — SIGNIFICANT CHANGE UP (ref 96–108)
CO2 SERPL-SCNC: 22 MMOL/L — SIGNIFICANT CHANGE UP (ref 22–29)
COLOR SPEC: YELLOW — SIGNIFICANT CHANGE UP
CREAT SERPL-MCNC: 0.86 MG/DL — SIGNIFICANT CHANGE UP (ref 0.5–1.3)
CRP SERPL-MCNC: <4 MG/L — SIGNIFICANT CHANGE UP
DIFF PNL FLD: NEGATIVE — SIGNIFICANT CHANGE UP
EGFR: 80 ML/MIN/1.73M2 — SIGNIFICANT CHANGE UP
EOSINOPHIL # BLD AUTO: 0.32 K/UL — SIGNIFICANT CHANGE UP (ref 0–0.5)
EOSINOPHIL NFR BLD AUTO: 5 % — SIGNIFICANT CHANGE UP (ref 0–6)
EPI CELLS # UR: SIGNIFICANT CHANGE UP
ERYTHROCYTE [SEDIMENTATION RATE] IN BLOOD: 13 MM/HR — SIGNIFICANT CHANGE UP (ref 0–20)
GLUCOSE SERPL-MCNC: 96 MG/DL — SIGNIFICANT CHANGE UP (ref 70–99)
GLUCOSE UR QL: NEGATIVE — SIGNIFICANT CHANGE UP
HCG SERPL-ACNC: <4 MIU/ML — SIGNIFICANT CHANGE UP
HCT VFR BLD CALC: 43 % — SIGNIFICANT CHANGE UP (ref 34.5–45)
HGB BLD-MCNC: 14.4 G/DL — SIGNIFICANT CHANGE UP (ref 11.5–15.5)
IMM GRANULOCYTES NFR BLD AUTO: 0.2 % — SIGNIFICANT CHANGE UP (ref 0–0.9)
KETONES UR-MCNC: NEGATIVE — SIGNIFICANT CHANGE UP
LEUKOCYTE ESTERASE UR-ACNC: ABNORMAL
LYMPHOCYTES # BLD AUTO: 1.97 K/UL — SIGNIFICANT CHANGE UP (ref 1–3.3)
LYMPHOCYTES # BLD AUTO: 31 % — SIGNIFICANT CHANGE UP (ref 13–44)
MCHC RBC-ENTMCNC: 29.4 PG — SIGNIFICANT CHANGE UP (ref 27–34)
MCHC RBC-ENTMCNC: 33.5 GM/DL — SIGNIFICANT CHANGE UP (ref 32–36)
MCV RBC AUTO: 87.9 FL — SIGNIFICANT CHANGE UP (ref 80–100)
MONOCYTES # BLD AUTO: 0.52 K/UL — SIGNIFICANT CHANGE UP (ref 0–0.9)
MONOCYTES NFR BLD AUTO: 8.2 % — SIGNIFICANT CHANGE UP (ref 2–14)
NEUTROPHILS # BLD AUTO: 3.46 K/UL — SIGNIFICANT CHANGE UP (ref 1.8–7.4)
NEUTROPHILS NFR BLD AUTO: 54.3 % — SIGNIFICANT CHANGE UP (ref 43–77)
NITRITE UR-MCNC: POSITIVE
PH UR: 7 — SIGNIFICANT CHANGE UP (ref 5–8)
PLATELET # BLD AUTO: 250 K/UL — SIGNIFICANT CHANGE UP (ref 150–400)
POTASSIUM SERPL-MCNC: 4.3 MMOL/L — SIGNIFICANT CHANGE UP (ref 3.5–5.3)
POTASSIUM SERPL-SCNC: 4.3 MMOL/L — SIGNIFICANT CHANGE UP (ref 3.5–5.3)
PROT SERPL-MCNC: 7.6 G/DL — SIGNIFICANT CHANGE UP (ref 6.6–8.7)
PROT UR-MCNC: 30 MG/DL
RBC # BLD: 4.89 M/UL — SIGNIFICANT CHANGE UP (ref 3.8–5.2)
RBC # FLD: 12.9 % — SIGNIFICANT CHANGE UP (ref 10.3–14.5)
RBC CASTS # UR COMP ASSIST: SIGNIFICANT CHANGE UP /HPF (ref 0–4)
SODIUM SERPL-SCNC: 142 MMOL/L — SIGNIFICANT CHANGE UP (ref 135–145)
SP GR SPEC: 1 — LOW (ref 1.01–1.02)
UROBILINOGEN FLD QL: NEGATIVE — SIGNIFICANT CHANGE UP
WBC # BLD: 6.36 K/UL — SIGNIFICANT CHANGE UP (ref 3.8–10.5)
WBC # FLD AUTO: 6.36 K/UL — SIGNIFICANT CHANGE UP (ref 3.8–10.5)
WBC UR QL: SIGNIFICANT CHANGE UP /HPF (ref 0–5)

## 2023-08-18 PROCEDURE — 85025 COMPLETE CBC W/AUTO DIFF WBC: CPT

## 2023-08-18 PROCEDURE — 74177 CT ABD & PELVIS W/CONTRAST: CPT | Mod: 26,MA

## 2023-08-18 PROCEDURE — 84702 CHORIONIC GONADOTROPIN TEST: CPT

## 2023-08-18 PROCEDURE — 99285 EMERGENCY DEPT VISIT HI MDM: CPT

## 2023-08-18 PROCEDURE — 86140 C-REACTIVE PROTEIN: CPT

## 2023-08-18 PROCEDURE — 87086 URINE CULTURE/COLONY COUNT: CPT

## 2023-08-18 PROCEDURE — 85652 RBC SED RATE AUTOMATED: CPT

## 2023-08-18 PROCEDURE — 81001 URINALYSIS AUTO W/SCOPE: CPT

## 2023-08-18 PROCEDURE — 36415 COLL VENOUS BLD VENIPUNCTURE: CPT

## 2023-08-18 PROCEDURE — 74177 CT ABD & PELVIS W/CONTRAST: CPT | Mod: MA

## 2023-08-18 PROCEDURE — 87186 SC STD MICRODIL/AGAR DIL: CPT

## 2023-08-18 PROCEDURE — 99284 EMERGENCY DEPT VISIT MOD MDM: CPT | Mod: 25

## 2023-08-18 PROCEDURE — 99282 EMERGENCY DEPT VISIT SF MDM: CPT

## 2023-08-18 PROCEDURE — 80053 COMPREHEN METABOLIC PANEL: CPT

## 2023-08-18 RX ORDER — LACTOBACILLUS RHAMNOSUS GG 10B CELL
1 CAPSULE ORAL
Qty: 1 | Refills: 0
Start: 2023-08-18

## 2023-08-18 RX ORDER — SODIUM CHLORIDE 9 MG/ML
1000 INJECTION INTRAMUSCULAR; INTRAVENOUS; SUBCUTANEOUS ONCE
Refills: 0 | Status: COMPLETED | OUTPATIENT
Start: 2023-08-18 | End: 2023-08-18

## 2023-08-18 RX ORDER — CEPHALEXIN 500 MG
500 CAPSULE ORAL ONCE
Refills: 0 | Status: COMPLETED | OUTPATIENT
Start: 2023-08-18 | End: 2023-08-18

## 2023-08-18 RX ORDER — CEPHALEXIN 500 MG
1 CAPSULE ORAL
Qty: 14 | Refills: 0
Start: 2023-08-18 | End: 2023-08-24

## 2023-08-18 RX ADMIN — Medication 500 MILLIGRAM(S): at 23:08

## 2023-08-18 RX ADMIN — SODIUM CHLORIDE 1000 MILLILITER(S): 9 INJECTION INTRAMUSCULAR; INTRAVENOUS; SUBCUTANEOUS at 17:14

## 2023-08-18 NOTE — ED ADULT NURSE NOTE - NSFALLUNIVINTERV_ED_ALL_ED
Bed/Stretcher in lowest position, wheels locked, appropriate side rails in place/Call bell, personal items and telephone in reach/Instruct patient to call for assistance before getting out of bed/chair/stretcher/Non-slip footwear applied when patient is off stretcher/[place] to call system/Physically safe environment - no spills, clutter or unnecessary equipment/Purposeful proactive rounding/Room/bathroom lighting operational, light cord in reach

## 2023-08-18 NOTE — ED PROVIDER NOTE - MUSCULOSKELETAL, MLM
Spine appears normal, range of motion is not limited. No midline C/T/L spine TTP. + right hip TTP noted. + passive FROM with no pain reported.

## 2023-08-18 NOTE — ED PROVIDER NOTE - PATIENT PORTAL LINK FT
You can access the FollowMyHealth Patient Portal offered by [facility] by registering at the following website: [email or web address]. By joining [facility]’s FollowMyHealth portal, you will also be able to view your health information using other applications (apps) compatible with our system.
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
## 2023-08-18 NOTE — ED PROVIDER NOTE - CARE PLAN
1 Principal Discharge DX:	Hip pain   Principal Discharge DX:	Hip pain  Secondary Diagnosis:	Acute UTI
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
## 2023-08-18 NOTE — ED PROVIDER NOTE - CARE PROVIDER_API CALL
Andrew Singh  Orthopaedic Surgery  [address] [place] [id number]  Phone: [phone]  Fax: [phone]  Follow Up Time:
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
## 2023-08-18 NOTE — ED PROVIDER NOTE - CLINICAL SUMMARY MEDICAL DECISION MAKING FREE TEXT BOX
KELECHI murray: Pt taken as signout from KELECHI jean.  Labs WNL, CT WNL, Ortho consulted to follow up out patient. KELECHI murray: Pt taken as signout from KELECHI jean.  Labs WNL, CT WNL, Ortho consulted to follow up out patient.  UA+      Pt reassessed, pt feeling better at this time, vss, pt able to walk, talk and vocalized plan of action. Discussed in depth and explained to pt in depth the next steps that need to be taken including proper follow up with PCP or specialists. All incidental findings were discussed with pt as well. Pt verbalized their concerns and all questions were answered. Pt understands dispo and wants discharge. Given good instructions when to return to ED, strict return precautions and importance of f/u.

## 2023-08-18 NOTE — ED PROVIDER NOTE - NSFOLLOWUPINSTRUCTIONS_ED_ALL_ED_FT
- Follow up with orthopedics as directed  - Take medication as directed    Urinary Tract Infection    A urinary tract infection (UTI) is an infection of any part of the urinary tract, which includes the kidneys, ureters, bladder, and urethra. Risk factors include ignoring your need to urinate, wiping back to front if female, being an uncircumcised male, and having diabetes or a weak immune system. Symptoms include frequent urination, pain or burning with urination, foul smelling urine, cloudy urine, pain in the lower abdomen, blood in the urine, and fever. If you were prescribed an antibiotic medicine, take it as told by your health care provider. Do not stop taking the antibiotic even if you start to feel better.    SEEK IMMEDIATE MEDICAL CARE IF YOU HAVE ANY OF THE FOLLOWING SYMPTOMS: severe back or abdominal pain, fever, inability to keep fluids or medicine down, dizziness/lightheadedness, or a change in mental status.
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
## 2023-08-18 NOTE — ED PROVIDER NOTE - NEUROLOGICAL, MLM
Alert and oriented, no focal deficits, no motor or sensory deficits. Difficulty with RLE SLR due to reported hip pain, otherwise 5/5 strength in UE/LE b/l.

## 2023-09-01 ENCOUNTER — TRANSCRIPTION ENCOUNTER (OUTPATIENT)
Age: 58
End: 2023-09-01

## 2023-09-12 ENCOUNTER — APPOINTMENT (OUTPATIENT)
Dept: ORTHOPEDIC SURGERY | Facility: CLINIC | Age: 58
End: 2023-09-12
Payer: MEDICAID

## 2023-09-12 VITALS
HEIGHT: 62 IN | SYSTOLIC BLOOD PRESSURE: 124 MMHG | DIASTOLIC BLOOD PRESSURE: 85 MMHG | HEART RATE: 82 BPM | BODY MASS INDEX: 27.6 KG/M2 | WEIGHT: 150 LBS

## 2023-09-12 DIAGNOSIS — M25.561 PAIN IN RIGHT KNEE: ICD-10-CM

## 2023-09-12 DIAGNOSIS — M25.562 PAIN IN RIGHT KNEE: ICD-10-CM

## 2023-09-12 DIAGNOSIS — M25.551 PAIN IN RIGHT HIP: ICD-10-CM

## 2023-09-12 DIAGNOSIS — M75.02 ADHESIVE CAPSULITIS OF LEFT SHOULDER: ICD-10-CM

## 2023-09-12 DIAGNOSIS — M25.512 PAIN IN LEFT SHOULDER: ICD-10-CM

## 2023-09-12 DIAGNOSIS — M25.50 PAIN IN UNSPECIFIED JOINT: ICD-10-CM

## 2023-09-12 PROCEDURE — 99213 OFFICE O/P EST LOW 20 MIN: CPT

## 2024-03-15 ENCOUNTER — OFFICE (OUTPATIENT)
Dept: URBAN - METROPOLITAN AREA CLINIC 115 | Facility: CLINIC | Age: 59
Setting detail: OPHTHALMOLOGY
End: 2024-03-15
Payer: MEDICAID

## 2024-03-15 DIAGNOSIS — H43.392: ICD-10-CM

## 2024-03-15 DIAGNOSIS — H16.222: ICD-10-CM

## 2024-03-15 DIAGNOSIS — H52.4: ICD-10-CM

## 2024-03-15 DIAGNOSIS — H16.223: ICD-10-CM

## 2024-03-15 DIAGNOSIS — H16.221: ICD-10-CM

## 2024-03-15 DIAGNOSIS — H35.40: ICD-10-CM

## 2024-03-15 DIAGNOSIS — H25.13: ICD-10-CM

## 2024-03-15 DIAGNOSIS — H35.363: ICD-10-CM

## 2024-03-15 PROCEDURE — 92250 FUNDUS PHOTOGRAPHY W/I&R: CPT | Performed by: OPHTHALMOLOGY

## 2024-03-15 PROCEDURE — 92015 DETERMINE REFRACTIVE STATE: CPT | Performed by: OPHTHALMOLOGY

## 2024-03-15 PROCEDURE — 92014 COMPRE OPH EXAM EST PT 1/>: CPT | Performed by: OPHTHALMOLOGY

## 2024-03-15 ASSESSMENT — REFRACTION_MANIFEST
OD_SPHERE: +0.50
OD_VA1: 20/25
OU_VA: 20/20
OS_SPHERE: +0.50
OD_SPHERE: +1.00
OS_ADD: +2.75
OS_VA1: 20/20
OS_AXIS: 002
OD_AXIS: 068
OD_CYLINDER: -0.50
OS_VA1: 20/25
OD_VA1: 20/20
OD_ADD: +2.00
OS_SPHERE: PLANO
OD_ADD: +2.75
OD_CYLINDER: -0.50
OD_AXIS: 085
OS_CYLINDER: -0.50
OS_AXIS: 005
OS_CYLINDER: -0.25
OS_ADD: +2.00

## 2024-03-15 ASSESSMENT — REFRACTION_CURRENTRX
OS_OVR_VA: 20/
OD_SPHERE: +2.75
OD_ADD: +2.50
OS_ADD: +2.50
OS_VPRISM_DIRECTION: SV
OD_SPHERE: +0.50
OD_OVR_VA: 20/
OS_OVR_VA: 20/
OD_AXIS: 180
OS_SPHERE: +0.50
OD_VPRISM_DIRECTION: SV
OS_OVR_VA: 20/
OD_OVR_VA: 20/
OD_OVR_VA: 20/
OD_CYLINDER: -0.50
OS_SPHERE: +2.75

## 2024-03-15 ASSESSMENT — SPHEQUIV_DERIVED
OS_SPHEQUIV: 0.25
OD_SPHEQUIV: 0.25
OD_SPHEQUIV: 0.75

## 2024-03-30 ENCOUNTER — EMERGENCY (EMERGENCY)
Facility: HOSPITAL | Age: 59
LOS: 1 days | Discharge: HOME | End: 2024-03-30
Attending: EMERGENCY MEDICINE
Payer: MEDICAID

## 2024-03-30 VITALS
HEIGHT: 64 IN | HEART RATE: 78 BPM | SYSTOLIC BLOOD PRESSURE: 114 MMHG | TEMPERATURE: 98 F | RESPIRATION RATE: 16 BRPM | DIASTOLIC BLOOD PRESSURE: 74 MMHG | WEIGHT: 130.51 LBS | OXYGEN SATURATION: 98 %

## 2024-03-30 DIAGNOSIS — Z90.710 ACQUIRED ABSENCE OF BOTH CERVIX AND UTERUS: Chronic | ICD-10-CM

## 2024-03-30 DIAGNOSIS — Z98.890 OTHER SPECIFIED POSTPROCEDURAL STATES: Chronic | ICD-10-CM

## 2024-03-30 DIAGNOSIS — Z41.9 ENCOUNTER FOR PROCEDURE FOR PURPOSES OTHER THAN REMEDYING HEALTH STATE, UNSPECIFIED: Chronic | ICD-10-CM

## 2024-03-30 LAB
ALBUMIN SERPL ELPH-MCNC: 4.4 G/DL — SIGNIFICANT CHANGE UP (ref 3.3–5.2)
ALP SERPL-CCNC: 49 U/L — SIGNIFICANT CHANGE UP (ref 40–120)
ALT FLD-CCNC: 67 U/L — HIGH
ANION GAP SERPL CALC-SCNC: 13 MMOL/L — SIGNIFICANT CHANGE UP (ref 5–17)
APPEARANCE UR: CLEAR — SIGNIFICANT CHANGE UP
AST SERPL-CCNC: 45 U/L — HIGH
BACTERIA # UR AUTO: ABNORMAL /HPF
BASOPHILS # BLD AUTO: 0.05 K/UL — SIGNIFICANT CHANGE UP (ref 0–0.2)
BASOPHILS NFR BLD AUTO: 0.9 % — SIGNIFICANT CHANGE UP (ref 0–2)
BILIRUB SERPL-MCNC: 0.4 MG/DL — SIGNIFICANT CHANGE UP (ref 0.4–2)
BILIRUB UR-MCNC: NEGATIVE — SIGNIFICANT CHANGE UP
BUN SERPL-MCNC: 17.3 MG/DL — SIGNIFICANT CHANGE UP (ref 8–20)
CALCIUM SERPL-MCNC: 9.5 MG/DL — SIGNIFICANT CHANGE UP (ref 8.4–10.5)
CAST: 1 /LPF — SIGNIFICANT CHANGE UP (ref 0–4)
CHLORIDE SERPL-SCNC: 104 MMOL/L — SIGNIFICANT CHANGE UP (ref 96–108)
CO2 SERPL-SCNC: 24 MMOL/L — SIGNIFICANT CHANGE UP (ref 22–29)
COLOR SPEC: YELLOW — SIGNIFICANT CHANGE UP
CREAT SERPL-MCNC: 0.77 MG/DL — SIGNIFICANT CHANGE UP (ref 0.5–1.3)
DIFF PNL FLD: NEGATIVE — SIGNIFICANT CHANGE UP
EGFR: 90 ML/MIN/1.73M2 — SIGNIFICANT CHANGE UP
EOSINOPHIL # BLD AUTO: 0.13 K/UL — SIGNIFICANT CHANGE UP (ref 0–0.5)
EOSINOPHIL NFR BLD AUTO: 2.3 % — SIGNIFICANT CHANGE UP (ref 0–6)
GLUCOSE SERPL-MCNC: 103 MG/DL — HIGH (ref 70–99)
GLUCOSE UR QL: NEGATIVE MG/DL — SIGNIFICANT CHANGE UP
HCG SERPL-ACNC: <4 MIU/ML — SIGNIFICANT CHANGE UP
HCT VFR BLD CALC: 44.7 % — SIGNIFICANT CHANGE UP (ref 34.5–45)
HGB BLD-MCNC: 14.6 G/DL — SIGNIFICANT CHANGE UP (ref 11.5–15.5)
IMM GRANULOCYTES NFR BLD AUTO: 0.2 % — SIGNIFICANT CHANGE UP (ref 0–0.9)
KETONES UR-MCNC: NEGATIVE MG/DL — SIGNIFICANT CHANGE UP
LEUKOCYTE ESTERASE UR-ACNC: ABNORMAL
LYMPHOCYTES # BLD AUTO: 1.56 K/UL — SIGNIFICANT CHANGE UP (ref 1–3.3)
LYMPHOCYTES # BLD AUTO: 28.2 % — SIGNIFICANT CHANGE UP (ref 13–44)
MCHC RBC-ENTMCNC: 28.4 PG — SIGNIFICANT CHANGE UP (ref 27–34)
MCHC RBC-ENTMCNC: 32.7 GM/DL — SIGNIFICANT CHANGE UP (ref 32–36)
MCV RBC AUTO: 87 FL — SIGNIFICANT CHANGE UP (ref 80–100)
MONOCYTES # BLD AUTO: 0.46 K/UL — SIGNIFICANT CHANGE UP (ref 0–0.9)
MONOCYTES NFR BLD AUTO: 8.3 % — SIGNIFICANT CHANGE UP (ref 2–14)
NEUTROPHILS # BLD AUTO: 3.33 K/UL — SIGNIFICANT CHANGE UP (ref 1.8–7.4)
NEUTROPHILS NFR BLD AUTO: 60.1 % — SIGNIFICANT CHANGE UP (ref 43–77)
NITRITE UR-MCNC: NEGATIVE — SIGNIFICANT CHANGE UP
PH UR: 8 — SIGNIFICANT CHANGE UP (ref 5–8)
PLATELET # BLD AUTO: 259 K/UL — SIGNIFICANT CHANGE UP (ref 150–400)
POTASSIUM SERPL-MCNC: 4.4 MMOL/L — SIGNIFICANT CHANGE UP (ref 3.5–5.3)
POTASSIUM SERPL-SCNC: 4.4 MMOL/L — SIGNIFICANT CHANGE UP (ref 3.5–5.3)
PROT SERPL-MCNC: 7.5 G/DL — SIGNIFICANT CHANGE UP (ref 6.6–8.7)
PROT UR-MCNC: SIGNIFICANT CHANGE UP MG/DL
RBC # BLD: 5.14 M/UL — SIGNIFICANT CHANGE UP (ref 3.8–5.2)
RBC # FLD: 13.1 % — SIGNIFICANT CHANGE UP (ref 10.3–14.5)
RBC CASTS # UR COMP ASSIST: 1 /HPF — SIGNIFICANT CHANGE UP (ref 0–4)
SODIUM SERPL-SCNC: 141 MMOL/L — SIGNIFICANT CHANGE UP (ref 135–145)
SP GR SPEC: 1.02 — SIGNIFICANT CHANGE UP (ref 1–1.03)
SQUAMOUS # UR AUTO: 3 /HPF — SIGNIFICANT CHANGE UP (ref 0–5)
TROPONIN T, HIGH SENSITIVITY RESULT: <6 NG/L — SIGNIFICANT CHANGE UP (ref 0–51)
UROBILINOGEN FLD QL: 1 MG/DL — SIGNIFICANT CHANGE UP (ref 0.2–1)
WBC # BLD: 5.54 K/UL — SIGNIFICANT CHANGE UP (ref 3.8–10.5)
WBC # FLD AUTO: 5.54 K/UL — SIGNIFICANT CHANGE UP (ref 3.8–10.5)
WBC UR QL: 14 /HPF — HIGH (ref 0–5)

## 2024-03-30 PROCEDURE — 99223 1ST HOSP IP/OBS HIGH 75: CPT

## 2024-03-30 PROCEDURE — 71045 X-RAY EXAM CHEST 1 VIEW: CPT | Mod: 26

## 2024-03-30 PROCEDURE — 93010 ELECTROCARDIOGRAM REPORT: CPT

## 2024-03-30 PROCEDURE — 70450 CT HEAD/BRAIN W/O DYE: CPT | Mod: 26,MC

## 2024-03-30 RX ORDER — ACETAMINOPHEN 500 MG
1000 TABLET ORAL ONCE
Refills: 0 | Status: COMPLETED | OUTPATIENT
Start: 2024-03-30 | End: 2024-03-30

## 2024-03-30 RX ORDER — DONEPEZIL HYDROCHLORIDE 10 MG/1
10 TABLET, FILM COATED ORAL AT BEDTIME
Refills: 0 | Status: DISCONTINUED | OUTPATIENT
Start: 2024-03-30 | End: 2024-04-06

## 2024-03-30 RX ORDER — SODIUM CHLORIDE 9 MG/ML
1000 INJECTION INTRAMUSCULAR; INTRAVENOUS; SUBCUTANEOUS ONCE
Refills: 0 | Status: COMPLETED | OUTPATIENT
Start: 2024-03-30 | End: 2024-03-30

## 2024-03-30 RX ORDER — ASPIRIN/CALCIUM CARB/MAGNESIUM 324 MG
81 TABLET ORAL DAILY
Refills: 0 | Status: DISCONTINUED | OUTPATIENT
Start: 2024-03-30 | End: 2024-04-06

## 2024-03-30 RX ORDER — ATORVASTATIN CALCIUM 80 MG/1
80 TABLET, FILM COATED ORAL AT BEDTIME
Refills: 0 | Status: DISCONTINUED | OUTPATIENT
Start: 2024-03-30 | End: 2024-04-06

## 2024-03-30 RX ORDER — MECLIZINE HCL 12.5 MG
25 TABLET ORAL ONCE
Refills: 0 | Status: COMPLETED | OUTPATIENT
Start: 2024-03-30 | End: 2024-03-30

## 2024-03-30 RX ORDER — MECLIZINE HCL 12.5 MG
25 TABLET ORAL
Refills: 0 | Status: DISCONTINUED | OUTPATIENT
Start: 2024-03-30 | End: 2024-04-06

## 2024-03-30 RX ORDER — DIAZEPAM 5 MG
5 TABLET ORAL ONCE
Refills: 0 | Status: DISCONTINUED | OUTPATIENT
Start: 2024-03-30 | End: 2024-03-30

## 2024-03-30 RX ORDER — LANOLIN ALCOHOL/MO/W.PET/CERES
5 CREAM (GRAM) TOPICAL ONCE
Refills: 0 | Status: COMPLETED | OUTPATIENT
Start: 2024-03-30 | End: 2024-03-30

## 2024-03-30 RX ADMIN — DONEPEZIL HYDROCHLORIDE 10 MILLIGRAM(S): 10 TABLET, FILM COATED ORAL at 22:19

## 2024-03-30 RX ADMIN — ATORVASTATIN CALCIUM 80 MILLIGRAM(S): 80 TABLET, FILM COATED ORAL at 22:18

## 2024-03-30 RX ADMIN — SODIUM CHLORIDE 1000 MILLILITER(S): 9 INJECTION INTRAMUSCULAR; INTRAVENOUS; SUBCUTANEOUS at 10:47

## 2024-03-30 RX ADMIN — Medication 5 MILLIGRAM(S): at 13:27

## 2024-03-30 RX ADMIN — Medication 400 MILLIGRAM(S): at 10:47

## 2024-03-30 RX ADMIN — Medication 5 MILLIGRAM(S): at 22:44

## 2024-03-30 RX ADMIN — Medication 25 MILLIGRAM(S): at 22:18

## 2024-03-30 RX ADMIN — Medication 25 MILLIGRAM(S): at 10:47

## 2024-03-30 NOTE — ED CDU PROVIDER INITIAL DAY NOTE - OBJECTIVE STATEMENT
59 y/o F with PMHx HLD, early onset dementia on Aricept, who presents to the ED for dizziness worsening for the past five days. States that the dizziness is worse with head movement and describes room spinning back and forth. States that this has never happened to her before. ED record reviewed including labs and CT scan.  CT with no acute findings. Patient treated with valium and meclizine here with minimal improvement. Placed in observation pending MR Head. Patient otherwise denies any recent illnesses, fevers, chills, NVD, abdominal pain, chest pain, dysuria, or any other complaints at this time.

## 2024-03-30 NOTE — ED CDU PROVIDER INITIAL DAY NOTE - NS ED ROS FT
Constitutional: no fever, no chills  Head: NC, AT  Eyes: no redness  ENMT: no nasal congestion/drainage  CV: no chest pain, no edema  Resp: no cough, no dyspnea  GI: no abdominal pain, no nausea, no vomiting, no diarrhea  : no dysuria  Skin: no lesions, no rashes  Neuro: no LOC, no headache, no sensory deficits

## 2024-03-30 NOTE — ED PROVIDER NOTE - CLINICAL SUMMARY MEDICAL DECISION MAKING FREE TEXT BOX
Kike: 58-year-old female history of hyperlipidemia presents to the emergency department complaining of dizziness since, spinning sensation over the past 6 days.  Labs were obtained which show no leukocytosis, normal H&H, normal BUN/creatinine and a slightly elevated glucose and mildly elevated liver enzymes.  A CT of the head was obtained which showed no acute process.  Patient was medicated with meclizine and Valium and while in the emergency department reports that she still having symptoms.  Her gait is steady and she is neurologically intact however she is still feeling off balance with walking with persistent dizziness.  Will place in observation obtain MRI.  Patient agrees with the plan.

## 2024-03-30 NOTE — ED PROVIDER NOTE - ATTENDING APP SHARED VISIT CONTRIBUTION OF CARE
I, Nilesh Giron, performed the initial face to face bedside interview with this patient regarding history of present illness, review of symptoms and relevant past medical, social and family history.  I completed an independent physical examination.  I was the initial provider who evaluated this patient. I have signed out the follow up of any pending tests (i.e. labs, radiological studies) to the ACP.  I have communicated the patient’s plan of care and disposition with the ACP.

## 2024-03-30 NOTE — ED CDU PROVIDER INITIAL DAY NOTE - ATTENDING CONTRIBUTION TO CARE
Mario: I performed a face to face evaluation of this patient and performed a full history and physical examination on the patient.  I agree with the resident's history, physical examination, and plan of the patient unless otherwise noted. My brief assessment is as follows: hx htn, dm, dementia p/w 5-6 days dizziness, worse when standing. feels off balance sometimes. no bleeding, no headache, no other neurologic symptoms. received meclizine/valium with minimal improvement. ct and labs  grossly unremarkable. neuro grossly intact. obs for mri.

## 2024-03-30 NOTE — ED CDU PROVIDER INITIAL DAY NOTE - CLINICAL SUMMARY MEDICAL DECISION MAKING FREE TEXT BOX
57 y/o F with PMHx HLD, early onset dementia on Aricept, who presents to the ED for dizziness worsening for the past five days. Patient treated with vertigo however remains symptomatic, CT with no acute findings, placed in observation for MR Head and continued meds.

## 2024-03-30 NOTE — ED PROVIDER NOTE - MUSCULOSKELETAL NEGATIVE STATEMENT, MLM
General: +fever, chills.  HENT: denies nasal congestion, sore throat, rhinorrhea, ear pain.  Eyes: denies visual changes, blurred vision, eye discharge, eye redness.  Neck: denies neck pain, neck swelling.  CV: denies chest pain, palpitations.  Resp: +cough.  Abdominal: denies nausea, vomiting, diarrhea, abdominal pain, blood in stool, dark stool.  MSK: left shoulder pain (chronic).  Neuro: denies headaches, numbness, tingling, dizziness, lightheadedness.  Skin: denies rashes, cuts, bruises.  Hematologic: denies unexplained bruises.
no back pain, no gout, no musculoskeletal pain, no neck pain, and no weakness.

## 2024-03-30 NOTE — ED PROVIDER NOTE - OBJECTIVE STATEMENT
The patient is a 58 year old female presents with dizziness for 5 days described as vision jumping around  Positional   No HA  No CP, No SOB, No abd pain, No motor No sensory loss  No diplopia, No dysphagia, No dysarthria

## 2024-03-30 NOTE — ED ADULT NURSE NOTE - CAS ELECT INFOMATION PROVIDED
Patient AA&Ox4, resp even/unlabored, ambulatory, steady gait noted, discharged home with all belongings. Med rec and discharge instructions explained and given to pt by KELECHI Mcadams. Patient verbalizes understanding on physician follow up, medication regimen and next dose, s/s of complications and monitoring. No distress noted. VSS, recorded in EMR. Peripheral IV removed, intact, bleeding controlled/DC instructions

## 2024-03-30 NOTE — ED ADULT NURSE NOTE - OBJECTIVE STATEMENT
Patient is alert and oriented X4 from home. Patient comes into the ER c/o dizziness that occurs with positional changes for the last 6 days. Pt also c/o feeling "like my eyes are jumping". +nausea

## 2024-03-30 NOTE — ED ADULT TRIAGE NOTE - CHIEF COMPLAINT QUOTE
Pt c/o dizziness that occurs with positional changes for the last 6 days. Pt also c/o feeling "like my eyes are jumping". +nausea

## 2024-03-31 VITALS
DIASTOLIC BLOOD PRESSURE: 79 MMHG | RESPIRATION RATE: 18 BRPM | HEART RATE: 70 BPM | SYSTOLIC BLOOD PRESSURE: 106 MMHG | OXYGEN SATURATION: 95 % | TEMPERATURE: 98 F

## 2024-03-31 PROCEDURE — 70450 CT HEAD/BRAIN W/O DYE: CPT | Mod: MC

## 2024-03-31 PROCEDURE — 84484 ASSAY OF TROPONIN QUANT: CPT

## 2024-03-31 PROCEDURE — 80053 COMPREHEN METABOLIC PANEL: CPT

## 2024-03-31 PROCEDURE — G0378: CPT

## 2024-03-31 PROCEDURE — 36415 COLL VENOUS BLD VENIPUNCTURE: CPT

## 2024-03-31 PROCEDURE — 81001 URINALYSIS AUTO W/SCOPE: CPT

## 2024-03-31 PROCEDURE — 70551 MRI BRAIN STEM W/O DYE: CPT | Mod: MC

## 2024-03-31 PROCEDURE — 96375 TX/PRO/DX INJ NEW DRUG ADDON: CPT

## 2024-03-31 PROCEDURE — 96374 THER/PROPH/DIAG INJ IV PUSH: CPT

## 2024-03-31 PROCEDURE — 84702 CHORIONIC GONADOTROPIN TEST: CPT

## 2024-03-31 PROCEDURE — 93005 ELECTROCARDIOGRAM TRACING: CPT

## 2024-03-31 PROCEDURE — 85025 COMPLETE CBC W/AUTO DIFF WBC: CPT

## 2024-03-31 PROCEDURE — 99285 EMERGENCY DEPT VISIT HI MDM: CPT | Mod: 25

## 2024-03-31 PROCEDURE — 71045 X-RAY EXAM CHEST 1 VIEW: CPT

## 2024-03-31 PROCEDURE — 99238 HOSP IP/OBS DSCHRG MGMT 30/<: CPT

## 2024-03-31 PROCEDURE — 70551 MRI BRAIN STEM W/O DYE: CPT | Mod: 26,MC

## 2024-03-31 RX ORDER — CEPHALEXIN 500 MG
500 CAPSULE ORAL EVERY 12 HOURS
Refills: 0 | Status: DISCONTINUED | OUTPATIENT
Start: 2024-03-31 | End: 2024-04-06

## 2024-03-31 RX ORDER — MECLIZINE HCL 12.5 MG
1 TABLET ORAL
Qty: 9 | Refills: 0
Start: 2024-03-31 | End: 2024-04-02

## 2024-03-31 RX ORDER — CEPHALEXIN 500 MG
1 CAPSULE ORAL
Qty: 14 | Refills: 0
Start: 2024-03-31 | End: 2024-04-06

## 2024-03-31 RX ADMIN — Medication 500 MILLIGRAM(S): at 10:29

## 2024-03-31 RX ADMIN — Medication 25 MILLIGRAM(S): at 05:47

## 2024-03-31 NOTE — ED CDU PROVIDER DISPOSITION NOTE - CARE PROVIDER_API CALL
Gerardo Smyth  Neurology  [address] [place] [address], [facility] [address]  [place] [address]  Phone: [phone]  Fax: [phone]  Follow Up Time:

## 2024-03-31 NOTE — ED CDU PROVIDER SUBSEQUENT DAY NOTE - PHYSICAL EXAMINATION
Gen: No acute distress, non toxic  HEENT: Mucous membranes moist, pink conjunctivae, EOMI. PERRL. Airway patent, uvula midline  Neck: FROM. No midline ttp.   CV: RRR, nl s1/s2.  Resp: CTAB, normal rate and effort  GI: Abdomen soft, NT, ND. No rebound, no guarding  Neuro: A&O x4, MAEx4. 5/5 str ext x 4. Sensation intact, symmetric throughout. No fnd's  MSK: No spine or joint tenderness to palpation  Skin: No rashes. intact and perfused. cap refill <2sec  Vascular; Radial and dorsalis pedal pulses 2+ b/l.

## 2024-03-31 NOTE — ED CDU PROVIDER SUBSEQUENT DAY NOTE - HISTORY
Pt resting comfortably at time of re-assessment. No events overnight. Pending MRI head. Will continue to monitor.

## 2024-03-31 NOTE — ED CDU PROVIDER DISPOSITION NOTE - PATIENT PORTAL LINK FT
You can access the FollowMyHealth Patient Portal offered by [facility] by registering at the following website: [email or web address]. By joining [facility]’s FollowMyHealth portal, you will also be able to view your health information using other applications (apps) compatible with our system.

## 2024-03-31 NOTE — ED CDU PROVIDER SUBSEQUENT DAY NOTE - PROGRESS NOTE DETAILS
Received during sign out. Patient in NAD. Respirations even and unlabored. A&Ox3. MAEx4. Feeling better. MRI negative for CVA. Optimized for discharge.

## 2024-03-31 NOTE — ED CDU PROVIDER DISPOSITION NOTE - ATTENDING CONTRIBUTION TO CARE
Pt in OBS on TIA protocol  Eval sig maxillary polyp which is coincidental finding and does not require intervention  Pt safe to dc  will send rx for uti and for vertigo  agree w outpt fu

## 2024-03-31 NOTE — ED CDU PROVIDER SUBSEQUENT DAY NOTE - NS ED ROS FT
Gen: denies fever, chills, fatigue, weight loss  Skin: denies rashes, laceration, bruising  HEENT: denies visual changes, ear pain, nasal congestion, throat pain  Respiratory: denies ESTRELLA, SOB, cough, wheezing  Cardiovascular: denies chest pain, palpitations, diaphoresis, LE edema  GI: denies abdominal pain, n/v/d  : denies dysuria, frequency, urgency, bowel/bladder incontinence  MSK: denies joint swelling/pain, back pain, neck pain  Neuro: +dizziness. denies headache, LOC, weakness, numbness  Psych: denies anxiety, depression, SI/HI, visual/auditory hallucinations

## 2024-03-31 NOTE — ED ADULT NURSE REASSESSMENT NOTE - NS ED NURSE REASSESS COMMENT FT1
Assumed care 1615, received report from Cristian RN, pt a&ox4, VSS, respirations even and unlabored on room air, no distress noted. Pt notes intermittent mild dizziness still at times, pt pending MRI.
Assumed care of pt from VINAY Mitchell RN at 1915. Pt is resting comfortably in stretcher. NAD. Pt is A&Ox4. Respirations are even and unlabored. Color is appropriate for race. Pt awaiting MRI. Pt updated on plan of care.
Patient AA&Ox4, resp even/unlabored, ambulatory, steady gait noted, discharged home with all belongings. Med rec and discharge instructions explained and given to pt by KELECHI Mcadams. Patient verbalizes understanding on physician follow up, medication regimen and next dose, s/s of complications and monitoring. No distress noted. VSS, recorded in EMR. Peripheral IV removed, intact, bleeding controlled
Assumed care of patient at 07:15 from RN MORGAN Estes. Charting as noted. Patient AA&Ox4, resp even/unlabored, presents to ED c/o dizziness. At time of assessment, patient denies pain/discomfort, denies CP/SOB. Patient updated on the plan of care, awaiting MRI. Stretcher locked in lowest position, IV site flushed w/ NS. No redness, swelling or pain noted to site. No signs of acute distress noted, safety maintained.
Pt is resting comfortably. NAD. Pt is A&Ox4. Respirations are even and unlabored. Plan of care on going.

## 2024-03-31 NOTE — ED CDU PROVIDER SUBSEQUENT DAY NOTE - ATTENDING APP SHARED VISIT CONTRIBUTION OF CARE
seen on rounds  in OBS on TIA protocol  feels well, able to ambulate independently  MR results sig polyp in sinus but no other acute findings  Pt needs assist w ENT fu  Also has uti  will send RX for both conditions  agree w discharge

## 2024-03-31 NOTE — ED CDU PROVIDER DISPOSITION NOTE - CLINICAL COURSE
59 yo female PMHx early onset dementia, HLD initially presented to ED c/o dizziness x5 days, vertiginous in nature. CT head negative. Patient placed in CDU for MRI which was negative for stroke. Made aware of incidental polyp vs. cyst. Has ENT appt. in July. Outpatient neurology follow up. +UTI, cephalexin. Medically stable for discharge. To be picked up.

## 2024-03-31 NOTE — ED CDU PROVIDER DISPOSITION NOTE - NSFOLLOWUPINSTRUCTIONS_ED_ALL_ED_FT
- Prescription sent to pharmacy.  - Please call tomorrow to schedule follow up appointment with neurologist.   - Please bring all documentation from your ED visit to any related future follow up appointment.  - Please call to schedule follow up appointment with your primary care physician within 24-48 hours.  - Please seek immediate medical attention or return to the ED for any new/worsening, signs/symptoms, or concerns.    Feel better!    Vertigo    Vertigo is the feeling that you or your surroundings are moving when they are not. This feeling can come and go at any time. Vertigo often goes away on its own. Vertigo can be dangerous if it occurs while you are doing something that could endanger you or others, such as driving or operating machinery.    Your health care provider will do tests to determine the cause of your vertigo. Tests will also help your health care provider decide how best to treat your condition.    Follow these instructions at home:      Eating and drinking      Drink enough fluid to keep your urine pale yellow.  Do not drink alcohol.        Activity    Return to your normal activities as told by your health care provider. Ask your health care provider what activities are safe for you.  In the morning, first sit up on the side of the bed. When you feel okay, stand slowly while you hold onto something until you know that your balance is fine.  Move slowly. Avoid sudden body or head movements or certain positions, as told by your health care provider.  If you have trouble walking or keeping your balance, try using a cane for stability. If you feel dizzy or unstable, sit down right away.  Avoid doing any tasks that would cause danger to you or others if vertigo occurs.  Avoid bending down if you feel dizzy. Place items in your home so that they are easy for you to reach without leaning over.  Do not drive or use heavy machinery if you feel dizzy.        General instructions    Take over-the-counter and prescription medicines only as told by your health care provider.  Keep all follow-up visits as told by your health care provider. This is important.    Contact a health care provider if:  Your medicines do not relieve your vertigo or they make it worse.  You have a fever.  Your condition gets worse or you develop new symptoms.  Your family or friends notice any behavioral changes.  Your nausea or vomiting gets worse.  You have numbness or a prickling and tingling sensation in part of your body.    Get help right away if you:  Have difficulty moving or speaking.  Are always dizzy.  Faint.  Develop severe headaches.  Have weakness in your hands, arms, or legs.  Have changes in your hearing or vision.  Develop a stiff neck.  Develop sensitivity to light.    Summary  Vertigo is the feeling that you or your surroundings are moving when they are not.  Your health care provider will do tests to determine the cause of your vertigo.  Follow instructions for home care. You may be told to avoid certain tasks, positions, or movements.  Contact a health care provider if your medicines do not relieve your symptoms, or if you have a fever, nausea, vomiting, or changes in behavior.  Get help right away if you have severe headaches or difficulty speaking, or you develop hearing or vision problems.    ADDITIONAL NOTES AND INSTRUCTIONS    Please follow up with your Primary MD in 24-48 hr.  Seek immediate medical care for any new/worsening signs or symptoms.         Urinary Tract Infection in Women    WHAT YOU NEED TO KNOW:    What is a urinary tract infection (UTI)? A UTI is caused by bacteria that get inside your urinary tract. Your urinary tract includes your kidneys, ureters, bladder, and urethra. Urine is made in your kidneys, and it flows from the ureters to the bladder. Urine leaves the bladder through the urethra. A UTI is more common in your lower urinary tract, which includes your bladder and urethra.     Female Urinary System         What increases my risk for a UTI?   •A urinary catheter or self-catheterization      •Pregnancy      •Urinary tract problems, such as a narrowing, kidney stones, or inability to empty your bladder completely      •History of a UTI      •Sexual intercourse      •Menopause      •Diabetes or obesity      What are the signs and symptoms of a UTI?   •Urinating more often than usual, leaking urine, or waking from sleep to urinate      •Pain or burning when you urinate      •Pain or pressure in your lower abdomen       •Urine that smells bad      •Blood in your urine      How is a UTI diagnosed? Your healthcare provider will ask about your signs and symptoms. Your provider may press on your abdomen, sides, and back to check if you feel pain. Your urine will be tested for bacteria that may be causing your infection. If you have UTIs often, you may need more tests to find the cause.    How is a UTI treated?   •Antibiotics help fight a bacterial infection. If you have UTIs often (called recurrent UTIs), you may be given antibiotics to take regularly. You will be given directions for when and how to use antibiotics. The goal is to prevent UTIs but not cause antibiotic resistance by using antibiotics too often.      •Medicines may be given to decrease pain and burning when you urinate. They will also help decrease the feeling that you need to urinate often. These medicines will make your urine orange or red.      What can I do to prevent a UTI?   •Talk to your healthcare provider about your birth control method. You may need to change your method if it is increasing your risk for UTIs.      •Empty your bladder often. Urinate and empty your bladder as soon as you feel the need. Do not hold your urine for long periods of time.      •Wipe from front to back after you urinate or have a bowel movement. This will help prevent germs from getting into your urinary tract through your urethra.      •Drink liquids as directed. Ask how much liquid to drink each day and which liquids are best for you. You may need to drink more liquids than usual to help flush out the bacteria. Do not drink alcohol, caffeine, or citrus juices. These can irritate your bladder and increase your symptoms. Your healthcare provider may recommend cranberry juice to help prevent a UTI.      •Urinate after you have sex. This can help flush out bacteria passed during sex.      •Do not douche or use feminine deodorants. These can change the chemical balance in your vagina.      •Change sanitary pads or tampons often. This will help prevent germs from getting into your urinary tract.       •Talk to your healthcare provider about your birth control method. You may need to change your method if it is increasing your risk for UTIs.      •Wear cotton underwear and clothes that are loose. Tight pants and nylon underwear can trap moisture and cause bacteria to grow.      •Vaginal estrogen may be recommended. This medicine helps prevent UTIs in women who have gone through menopause or are in lina-menopause.      •Do pelvic muscle exercises often. Pelvic muscle exercises may help you start and stop urinating. Strong pelvic muscles may help you empty your bladder easier. Squeeze these muscles tightly for 5 seconds like you are trying to hold back urine. Then relax for 5 seconds. Gradually work up to squeezing for 10 seconds. Do 3 sets of 15 repetitions a day, or as directed.      When should I seek immediate care?   •You are urinating very little or not at all.      •You have a high fever with shaking chills.       •You have side or back pain that gets worse.      When should I call my doctor?   •You have a mild fever.      •You do not feel better after 2 days of taking antibiotics.      •You have new symptoms, such as blood or pus in your urine.       •You are vomiting.       •You have questions or concerns about your condition or care.      CARE AGREEMENT:    You have the right to help plan your care. Learn about your health condition and how it may be treated. Discuss treatment options with your healthcare providers to decide what care you want to receive. You always have the right to refuse treatment.

## 2024-03-31 NOTE — ED CDU PROVIDER SUBSEQUENT DAY NOTE - CLINICAL SUMMARY MEDICAL DECISION MAKING FREE TEXT BOX
59 y/o F with PMHx HLD, early onset dementia on Aricept, who presents to the ED for dizziness worsening for the past five days. Patient treated with vertigo however remains symptomatic, labs unremarkable. CT with no acute findings, placed in observation for MR Head and continued meds.

## 2024-04-03 DIAGNOSIS — E78.00 PURE HYPERCHOLESTEROLEMIA, UNSPECIFIED: ICD-10-CM

## 2024-04-03 DIAGNOSIS — Z86.73 PERSONAL HISTORY OF TRANSIENT ISCHEMIC ATTACK (TIA), AND CEREBRAL INFARCTION WITHOUT RESIDUAL DEFICITS: ICD-10-CM

## 2024-04-03 DIAGNOSIS — F03.90 UNSPECIFIED DEMENTIA WITHOUT BEHAVIORAL DISTURBANCE: ICD-10-CM

## 2024-04-03 DIAGNOSIS — Z91.030 BEE ALLERGY STATUS: ICD-10-CM

## 2024-04-03 DIAGNOSIS — R42 DIZZINESS AND GIDDINESS: ICD-10-CM

## 2024-04-03 DIAGNOSIS — N39.0 URINARY TRACT INFECTION, SITE NOT SPECIFIED: ICD-10-CM

## 2024-04-26 ENCOUNTER — OFFICE (OUTPATIENT)
Dept: URBAN - METROPOLITAN AREA CLINIC 115 | Facility: CLINIC | Age: 59
Setting detail: OPHTHALMOLOGY
End: 2024-04-26
Payer: MEDICAID

## 2024-04-26 DIAGNOSIS — H43.392: ICD-10-CM

## 2024-04-26 DIAGNOSIS — H16.222: ICD-10-CM

## 2024-04-26 DIAGNOSIS — H16.223: ICD-10-CM

## 2024-04-26 DIAGNOSIS — H35.363: ICD-10-CM

## 2024-04-26 DIAGNOSIS — H25.13: ICD-10-CM

## 2024-04-26 DIAGNOSIS — H35.40: ICD-10-CM

## 2024-04-26 DIAGNOSIS — H16.221: ICD-10-CM

## 2024-04-26 PROCEDURE — 99213 OFFICE O/P EST LOW 20 MIN: CPT | Performed by: OPHTHALMOLOGY

## 2024-04-26 PROCEDURE — 92134 CPTRZ OPH DX IMG PST SGM RTA: CPT | Performed by: OPHTHALMOLOGY

## 2024-05-30 ENCOUNTER — APPOINTMENT (OUTPATIENT)
Dept: DERMATOLOGY | Facility: CLINIC | Age: 59
End: 2024-05-30

## 2024-07-01 ENCOUNTER — APPOINTMENT (OUTPATIENT)
Dept: OTOLARYNGOLOGY | Facility: CLINIC | Age: 59
End: 2024-07-01

## 2024-09-09 ENCOUNTER — APPOINTMENT (OUTPATIENT)
Dept: NEUROLOGY | Facility: CLINIC | Age: 59
End: 2024-09-09

## 2024-11-13 ENCOUNTER — APPOINTMENT (OUTPATIENT)
Dept: DERMATOLOGY | Facility: CLINIC | Age: 59
End: 2024-11-13
Payer: MEDICAID

## 2024-11-13 DIAGNOSIS — L72.0 EPIDERMAL CYST: ICD-10-CM

## 2024-11-13 DIAGNOSIS — D22.72 MELANOCYTIC NEVI OF LEFT LOWER LIMB, INCLUDING HIP: ICD-10-CM

## 2024-11-13 PROCEDURE — 99202 OFFICE O/P NEW SF 15 MIN: CPT

## 2024-11-13 RX ORDER — ICOSAPENT ETHYL 500 MG/1
0.5 CAPSULE ORAL
Refills: 0 | Status: ACTIVE | COMMUNITY

## 2024-11-13 RX ORDER — CHOLECALCIFEROL (VITAMIN D3) 25 MCG
TABLET ORAL
Refills: 0 | Status: ACTIVE | COMMUNITY

## 2024-11-13 RX ORDER — DENOSUMAB 60 MG/ML
60 INJECTION SUBCUTANEOUS
Refills: 0 | Status: ACTIVE | COMMUNITY

## 2024-11-13 RX ORDER — TIRZEPATIDE 5 MG/.5ML
5 INJECTION, SOLUTION SUBCUTANEOUS
Refills: 0 | Status: ACTIVE | COMMUNITY

## 2024-11-13 RX ORDER — DONEPEZIL HYDROCHLORIDE 23 MG/1
TABLET, FILM COATED ORAL
Refills: 0 | Status: ACTIVE | COMMUNITY

## 2024-11-27 ENCOUNTER — APPOINTMENT (OUTPATIENT)
Dept: ORTHOPEDIC SURGERY | Facility: CLINIC | Age: 59
End: 2024-11-27
Payer: MEDICARE

## 2024-11-27 VITALS
DIASTOLIC BLOOD PRESSURE: 72 MMHG | SYSTOLIC BLOOD PRESSURE: 105 MMHG | HEART RATE: 85 BPM | HEIGHT: 62 IN | BODY MASS INDEX: 27.6 KG/M2 | WEIGHT: 150 LBS

## 2024-11-27 DIAGNOSIS — M17.10 UNILATERAL PRIMARY OSTEOARTHRITIS, UNSPECIFIED KNEE: ICD-10-CM

## 2024-11-27 DIAGNOSIS — M25.562 PAIN IN LEFT KNEE: ICD-10-CM

## 2024-11-27 PROCEDURE — 99204 OFFICE O/P NEW MOD 45 MIN: CPT

## 2024-11-27 PROCEDURE — 73564 X-RAY EXAM KNEE 4 OR MORE: CPT | Mod: LT

## 2024-11-27 PROCEDURE — G2211 COMPLEX E/M VISIT ADD ON: CPT

## 2024-11-27 RX ORDER — DICLOFENAC SODIUM 50 MG/1
50 TABLET, DELAYED RELEASE ORAL
Qty: 60 | Refills: 0 | Status: ACTIVE | COMMUNITY
Start: 2024-11-27 | End: 1900-01-01

## 2025-04-02 ENCOUNTER — OFFICE (OUTPATIENT)
Dept: URBAN - METROPOLITAN AREA CLINIC 115 | Facility: CLINIC | Age: 60
Setting detail: OPHTHALMOLOGY
End: 2025-04-02
Payer: MEDICAID

## 2025-04-02 DIAGNOSIS — H40.013: ICD-10-CM

## 2025-04-02 DIAGNOSIS — H40.011: ICD-10-CM

## 2025-04-02 DIAGNOSIS — H25.13: ICD-10-CM

## 2025-04-02 DIAGNOSIS — H43.392: ICD-10-CM

## 2025-04-02 DIAGNOSIS — H35.40: ICD-10-CM

## 2025-04-02 DIAGNOSIS — H16.222: ICD-10-CM

## 2025-04-02 DIAGNOSIS — H16.223: ICD-10-CM

## 2025-04-02 DIAGNOSIS — H40.012: ICD-10-CM

## 2025-04-02 DIAGNOSIS — H35.363: ICD-10-CM

## 2025-04-02 DIAGNOSIS — H16.221: ICD-10-CM

## 2025-04-02 PROCEDURE — 83861 MICROFLUID ANALY TEARS: CPT | Mod: QW,RT | Performed by: OPHTHALMOLOGY

## 2025-04-02 PROCEDURE — 92250 FUNDUS PHOTOGRAPHY W/I&R: CPT | Performed by: OPHTHALMOLOGY

## 2025-04-02 PROCEDURE — 92014 COMPRE OPH EXAM EST PT 1/>: CPT | Performed by: OPHTHALMOLOGY

## 2025-04-02 PROCEDURE — 83861 MICROFLUID ANALY TEARS: CPT | Mod: QW,LT | Performed by: OPHTHALMOLOGY

## 2025-04-02 ASSESSMENT — VISUAL ACUITY
OD_BCVA: 20/20
OS_BCVA: 20/25

## 2025-04-02 ASSESSMENT — REFRACTION_CURRENTRX
OD_VPRISM_DIRECTION: SV
OS_VPRISM_DIRECTION: SV
OD_OVR_VA: 20/
OD_ADD: +2.50
OS_OVR_VA: 20/
OS_SPHERE: +0.50
OD_SPHERE: +0.50
OD_OVR_VA: 20/
OD_OVR_VA: 20/
OD_CYLINDER: -0.50
OS_OVR_VA: 20/
OD_AXIS: 180
OS_OVR_VA: 20/
OS_SPHERE: +2.75
OS_ADD: +2.50
OD_SPHERE: +2.75

## 2025-04-02 ASSESSMENT — REFRACTION_MANIFEST
OS_AXIS: 002
OS_SPHERE: +0.50
OD_ADD: +2.75
OD_CYLINDER: -0.50
OD_SPHERE: +0.50
OS_ADD: +2.75
OD_VA1: 20/25
OD_CYLINDER: -0.50
OS_CYLINDER: -0.50
OU_VA: 20/20
OS_VA1: 20/25
OD_VA1: 20/20
OS_ADD: +2.00
OD_ADD: +2.00
OS_SPHERE: PLANO
OS_VA1: 20/20
OD_SPHERE: +1.00
OD_AXIS: 085
OD_AXIS: 068
OS_AXIS: 005
OS_CYLINDER: -0.25

## 2025-04-02 ASSESSMENT — REFRACTION_AUTOREFRACTION
OD_CYLINDER: -0.75
OS_SPHERE: +0.75
OD_SPHERE: +1.25
OS_AXIS: 114
OS_CYLINDER: -0.25
OD_AXIS: 085

## 2025-04-02 ASSESSMENT — SUPERFICIAL PUNCTATE KERATITIS (SPK)
OD_SPK: T
OS_SPK: T

## 2025-04-02 ASSESSMENT — TONOMETRY
OD_IOP_MMHG: 18
OS_IOP_MMHG: 21

## 2025-04-02 ASSESSMENT — CONFRONTATIONAL VISUAL FIELD TEST (CVF)
OD_FINDINGS: FULL
OS_FINDINGS: FULL

## 2025-05-07 ENCOUNTER — OFFICE (OUTPATIENT)
Dept: URBAN - METROPOLITAN AREA CLINIC 115 | Facility: CLINIC | Age: 60
Setting detail: OPHTHALMOLOGY
End: 2025-05-07
Payer: MEDICAID

## 2025-05-07 DIAGNOSIS — H16.222: ICD-10-CM

## 2025-05-07 DIAGNOSIS — H16.223: ICD-10-CM

## 2025-05-07 DIAGNOSIS — H43.391: ICD-10-CM

## 2025-05-07 DIAGNOSIS — H16.221: ICD-10-CM

## 2025-05-07 DIAGNOSIS — H43.392: ICD-10-CM

## 2025-05-07 DIAGNOSIS — H40.013: ICD-10-CM

## 2025-05-07 DIAGNOSIS — H25.13: ICD-10-CM

## 2025-05-07 DIAGNOSIS — H43.393: ICD-10-CM

## 2025-05-07 DIAGNOSIS — H40.012: ICD-10-CM

## 2025-05-07 DIAGNOSIS — H35.40: ICD-10-CM

## 2025-05-07 DIAGNOSIS — H35.363: ICD-10-CM

## 2025-05-07 DIAGNOSIS — H40.011: ICD-10-CM

## 2025-05-07 PROCEDURE — 92202 OPSCPY EXTND ON/MAC DRAW: CPT | Performed by: OPHTHALMOLOGY

## 2025-05-07 PROCEDURE — 99213 OFFICE O/P EST LOW 20 MIN: CPT | Performed by: OPHTHALMOLOGY

## 2025-05-07 PROCEDURE — 92133 CPTRZD OPH DX IMG PST SGM ON: CPT | Performed by: OPHTHALMOLOGY

## 2025-05-07 ASSESSMENT — REFRACTION_MANIFEST
OU_VA: 20/20
OS_SPHERE: +0.50
OD_SPHERE: +1.00
OS_VA1: 20/25
OD_AXIS: 068
OD_ADD: +2.75
OS_CYLINDER: -0.50
OD_CYLINDER: -0.50
OS_CYLINDER: -0.25
OD_AXIS: 085
OS_AXIS: 002
OS_ADD: +2.00
OD_VA1: 20/20
OS_SPHERE: PLANO
OS_VA1: 20/20
OD_VA1: 20/25
OD_ADD: +2.00
OS_ADD: +2.75
OD_SPHERE: +0.50
OS_AXIS: 005
OD_CYLINDER: -0.50

## 2025-05-07 ASSESSMENT — REFRACTION_CURRENTRX
OS_OVR_VA: 20/
OD_VPRISM_DIRECTION: SV
OS_OVR_VA: 20/
OD_AXIS: 180
OS_OVR_VA: 20/
OD_OVR_VA: 20/
OD_CYLINDER: -0.50
OS_VPRISM_DIRECTION: SV
OS_SPHERE: +0.50
OD_ADD: +2.50
OD_SPHERE: +2.75
OD_OVR_VA: 20/
OS_SPHERE: +2.75
OD_OVR_VA: 20/
OS_ADD: +2.50
OD_SPHERE: +0.50

## 2025-05-07 ASSESSMENT — REFRACTION_AUTOREFRACTION
OD_CYLINDER: -0.50
OD_SPHERE: +1.00
OS_SPHERE: +0.25
OD_AXIS: 081
OS_CYLINDER: -0.50
OS_AXIS: 173

## 2025-05-07 ASSESSMENT — CONFRONTATIONAL VISUAL FIELD TEST (CVF)
OS_FINDINGS: FULL
OD_FINDINGS: FULL

## 2025-05-07 ASSESSMENT — SUPERFICIAL PUNCTATE KERATITIS (SPK)
OS_SPK: T
OD_SPK: T

## 2025-05-07 ASSESSMENT — TONOMETRY
OS_IOP_MMHG: 18
OD_IOP_MMHG: 21

## 2025-05-07 ASSESSMENT — VISUAL ACUITY
OS_BCVA: 20/20-1
OD_BCVA: 20/20

## 2025-06-20 NOTE — ED CDU PROVIDER SUBSEQUENT DAY NOTE - WR ORDER STATUS 1
[email or web address]  URL:  [email or web address]  Resulted